# Patient Record
Sex: MALE | Race: WHITE | NOT HISPANIC OR LATINO | Employment: FULL TIME | URBAN - METROPOLITAN AREA
[De-identification: names, ages, dates, MRNs, and addresses within clinical notes are randomized per-mention and may not be internally consistent; named-entity substitution may affect disease eponyms.]

---

## 2017-05-17 ENCOUNTER — RESOLUTE PROFESSIONAL BILLING HOSPITAL PROF FEE (OUTPATIENT)
Dept: HOSPITALIST | Facility: MEDICAL CENTER | Age: 37
End: 2017-05-17
Payer: COMMERCIAL

## 2017-05-18 ENCOUNTER — APPOINTMENT (OUTPATIENT)
Dept: RADIOLOGY | Facility: MEDICAL CENTER | Age: 37
DRG: 872 | End: 2017-05-18
Attending: EMERGENCY MEDICINE
Payer: COMMERCIAL

## 2017-05-18 ENCOUNTER — HOSPITAL ENCOUNTER (INPATIENT)
Facility: MEDICAL CENTER | Age: 37
LOS: 3 days | DRG: 872 | End: 2017-05-21
Attending: EMERGENCY MEDICINE | Admitting: HOSPITALIST
Payer: COMMERCIAL

## 2017-05-18 DIAGNOSIS — L03.116 CELLULITIS OF LEFT LOWER EXTREMITY: ICD-10-CM

## 2017-05-18 LAB
ALBUMIN SERPL BCP-MCNC: 3.3 G/DL (ref 3.2–4.9)
ALBUMIN/GLOB SERPL: 0.8 G/DL
ALP SERPL-CCNC: 103 U/L (ref 30–99)
ALT SERPL-CCNC: 76 U/L (ref 2–50)
ANION GAP SERPL CALC-SCNC: 7 MMOL/L (ref 0–11.9)
APTT PPP: 35.6 SEC (ref 24.7–36)
AST SERPL-CCNC: 27 U/L (ref 12–45)
BASOPHILS # BLD AUTO: 0.5 % (ref 0–1.8)
BASOPHILS # BLD: 0.09 K/UL (ref 0–0.12)
BILIRUB SERPL-MCNC: 0.9 MG/DL (ref 0.1–1.5)
BUN SERPL-MCNC: 13 MG/DL (ref 8–22)
CALCIUM SERPL-MCNC: 9.2 MG/DL (ref 8.5–10.5)
CHLORIDE SERPL-SCNC: 100 MMOL/L (ref 96–112)
CO2 SERPL-SCNC: 25 MMOL/L (ref 20–33)
CREAT SERPL-MCNC: 0.69 MG/DL (ref 0.5–1.4)
CRP SERPL HS-MCNC: 15.68 MG/DL (ref 0–0.75)
EOSINOPHIL # BLD AUTO: 0.08 K/UL (ref 0–0.51)
EOSINOPHIL NFR BLD: 0.5 % (ref 0–6.9)
ERYTHROCYTE [DISTWIDTH] IN BLOOD BY AUTOMATED COUNT: 40.8 FL (ref 35.9–50)
ERYTHROCYTE [SEDIMENTATION RATE] IN BLOOD BY WESTERGREN METHOD: 92 MM/HOUR (ref 0–15)
GFR SERPL CREATININE-BSD FRML MDRD: >60 ML/MIN/1.73 M 2
GLOBULIN SER CALC-MCNC: 4.1 G/DL (ref 1.9–3.5)
GLUCOSE SERPL-MCNC: 104 MG/DL (ref 65–99)
HCT VFR BLD AUTO: 40.3 % (ref 42–52)
HGB BLD-MCNC: 13.5 G/DL (ref 14–18)
IMM GRANULOCYTES # BLD AUTO: 0.31 K/UL (ref 0–0.11)
IMM GRANULOCYTES NFR BLD AUTO: 1.8 % (ref 0–0.9)
INR PPP: 1.15 (ref 0.87–1.13)
LACTATE BLD-SCNC: 2.3 MMOL/L (ref 0.5–2)
LYMPHOCYTES # BLD AUTO: 1.75 K/UL (ref 1–4.8)
LYMPHOCYTES NFR BLD: 9.9 % (ref 22–41)
MCH RBC QN AUTO: 29.5 PG (ref 27–33)
MCHC RBC AUTO-ENTMCNC: 33.5 G/DL (ref 33.7–35.3)
MCV RBC AUTO: 88.2 FL (ref 81.4–97.8)
MONOCYTES # BLD AUTO: 1.66 K/UL (ref 0–0.85)
MONOCYTES NFR BLD AUTO: 9.4 % (ref 0–13.4)
NEUTROPHILS # BLD AUTO: 13.8 K/UL (ref 1.82–7.42)
NEUTROPHILS NFR BLD: 77.9 % (ref 44–72)
NRBC # BLD AUTO: 0 K/UL
NRBC BLD AUTO-RTO: 0 /100 WBC
PLATELET # BLD AUTO: 471 K/UL (ref 164–446)
PMV BLD AUTO: 9.3 FL (ref 9–12.9)
POTASSIUM SERPL-SCNC: 4.1 MMOL/L (ref 3.6–5.5)
PROT SERPL-MCNC: 7.4 G/DL (ref 6–8.2)
PROTHROMBIN TIME: 15.1 SEC (ref 12–14.6)
RBC # BLD AUTO: 4.57 M/UL (ref 4.7–6.1)
SODIUM SERPL-SCNC: 132 MMOL/L (ref 135–145)
URATE SERPL-MCNC: 3.8 MG/DL (ref 2.5–8.3)
WBC # BLD AUTO: 17.7 K/UL (ref 4.8–10.8)

## 2017-05-18 PROCEDURE — 700111 HCHG RX REV CODE 636 W/ 250 OVERRIDE (IP): Performed by: HOSPITALIST

## 2017-05-18 PROCEDURE — 99222 1ST HOSP IP/OBS MODERATE 55: CPT | Performed by: HOSPITALIST

## 2017-05-18 PROCEDURE — 85610 PROTHROMBIN TIME: CPT

## 2017-05-18 PROCEDURE — 700105 HCHG RX REV CODE 258: Performed by: EMERGENCY MEDICINE

## 2017-05-18 PROCEDURE — 83605 ASSAY OF LACTIC ACID: CPT

## 2017-05-18 PROCEDURE — 700105 HCHG RX REV CODE 258: Performed by: HOSPITALIST

## 2017-05-18 PROCEDURE — 84550 ASSAY OF BLOOD/URIC ACID: CPT

## 2017-05-18 PROCEDURE — 99285 EMERGENCY DEPT VISIT HI MDM: CPT

## 2017-05-18 PROCEDURE — A9270 NON-COVERED ITEM OR SERVICE: HCPCS | Performed by: HOSPITALIST

## 2017-05-18 PROCEDURE — 85025 COMPLETE CBC W/AUTO DIFF WBC: CPT

## 2017-05-18 PROCEDURE — 700111 HCHG RX REV CODE 636 W/ 250 OVERRIDE (IP): Performed by: EMERGENCY MEDICINE

## 2017-05-18 PROCEDURE — 73610 X-RAY EXAM OF ANKLE: CPT | Mod: LT

## 2017-05-18 PROCEDURE — 85730 THROMBOPLASTIN TIME PARTIAL: CPT

## 2017-05-18 PROCEDURE — 700105 HCHG RX REV CODE 258

## 2017-05-18 PROCEDURE — 87040 BLOOD CULTURE FOR BACTERIA: CPT

## 2017-05-18 PROCEDURE — 93971 EXTREMITY STUDY: CPT | Mod: 26 | Performed by: SURGERY

## 2017-05-18 PROCEDURE — 96375 TX/PRO/DX INJ NEW DRUG ADDON: CPT

## 2017-05-18 PROCEDURE — 86140 C-REACTIVE PROTEIN: CPT

## 2017-05-18 PROCEDURE — 700102 HCHG RX REV CODE 250 W/ 637 OVERRIDE(OP): Performed by: HOSPITALIST

## 2017-05-18 PROCEDURE — 96365 THER/PROPH/DIAG IV INF INIT: CPT

## 2017-05-18 PROCEDURE — 93971 EXTREMITY STUDY: CPT

## 2017-05-18 PROCEDURE — 770006 HCHG ROOM/CARE - MED/SURG/GYN SEMI*

## 2017-05-18 PROCEDURE — 700111 HCHG RX REV CODE 636 W/ 250 OVERRIDE (IP)

## 2017-05-18 PROCEDURE — 85652 RBC SED RATE AUTOMATED: CPT

## 2017-05-18 PROCEDURE — 80053 COMPREHEN METABOLIC PANEL: CPT

## 2017-05-18 RX ORDER — ONDANSETRON 2 MG/ML
4 INJECTION INTRAMUSCULAR; INTRAVENOUS ONCE
Status: COMPLETED | OUTPATIENT
Start: 2017-05-18 | End: 2017-05-18

## 2017-05-18 RX ORDER — ERGOCALCIFEROL 1.25 MG/1
50000 CAPSULE ORAL
COMMUNITY

## 2017-05-18 RX ORDER — OXYCODONE AND ACETAMINOPHEN 7.5; 325 MG/1; MG/1
1 TABLET ORAL EVERY 8 HOURS PRN
COMMUNITY
End: 2019-03-31

## 2017-05-18 RX ORDER — LOSARTAN POTASSIUM 25 MG/1
25 TABLET ORAL DAILY
COMMUNITY
End: 2017-05-18

## 2017-05-18 RX ORDER — MELOXICAM 15 MG/1
15 TABLET ORAL DAILY
COMMUNITY
End: 2017-06-15 | Stop reason: SDUPTHER

## 2017-05-18 RX ORDER — SODIUM CHLORIDE 9 MG/ML
INJECTION, SOLUTION INTRAVENOUS CONTINUOUS
Status: DISCONTINUED | OUTPATIENT
Start: 2017-05-18 | End: 2017-05-20

## 2017-05-18 RX ORDER — PROMETHAZINE HYDROCHLORIDE 25 MG/1
12.5-25 TABLET ORAL EVERY 4 HOURS PRN
Status: DISCONTINUED | OUTPATIENT
Start: 2017-05-18 | End: 2017-05-21 | Stop reason: HOSPADM

## 2017-05-18 RX ORDER — AMOXICILLIN 250 MG
2 CAPSULE ORAL 2 TIMES DAILY
Status: DISCONTINUED | OUTPATIENT
Start: 2017-05-18 | End: 2017-05-21 | Stop reason: HOSPADM

## 2017-05-18 RX ORDER — PREDNISONE 20 MG/1
10 TABLET ORAL DAILY
Status: DISCONTINUED | OUTPATIENT
Start: 2017-05-18 | End: 2017-05-20

## 2017-05-18 RX ORDER — TRAMADOL HYDROCHLORIDE 50 MG/1
50 TABLET ORAL EVERY 6 HOURS PRN
COMMUNITY
End: 2019-03-31

## 2017-05-18 RX ORDER — FLUTICASONE PROPIONATE 50 MCG
1 SPRAY, SUSPENSION (ML) NASAL DAILY
COMMUNITY

## 2017-05-18 RX ORDER — DEXTROAMPHETAMINE SACCHARATE, AMPHETAMINE ASPARTATE MONOHYDRATE, DEXTROAMPHETAMINE SULFATE AND AMPHETAMINE SULFATE 2.5; 2.5; 2.5; 2.5 MG/1; MG/1; MG/1; MG/1
10 CAPSULE, EXTENDED RELEASE ORAL EVERY MORNING
COMMUNITY
End: 2017-05-18

## 2017-05-18 RX ORDER — LOSARTAN POTASSIUM 50 MG/1
100 TABLET ORAL DAILY
Status: DISCONTINUED | OUTPATIENT
Start: 2017-05-18 | End: 2017-05-21 | Stop reason: HOSPADM

## 2017-05-18 RX ORDER — HYDROCODONE BITARTRATE AND ACETAMINOPHEN 10; 325 MG/1; MG/1
1-2 TABLET ORAL EVERY 6 HOURS PRN
COMMUNITY
End: 2017-05-18

## 2017-05-18 RX ORDER — PROMETHAZINE HYDROCHLORIDE 25 MG/1
12.5-25 SUPPOSITORY RECTAL EVERY 4 HOURS PRN
Status: DISCONTINUED | OUTPATIENT
Start: 2017-05-18 | End: 2017-05-21 | Stop reason: HOSPADM

## 2017-05-18 RX ORDER — IBUPROFEN 400 MG/1
600 TABLET ORAL EVERY 6 HOURS PRN
Status: DISCONTINUED | OUTPATIENT
Start: 2017-05-18 | End: 2017-05-21 | Stop reason: HOSPADM

## 2017-05-18 RX ORDER — POLYETHYLENE GLYCOL 3350 17 G/17G
1 POWDER, FOR SOLUTION ORAL
Status: DISCONTINUED | OUTPATIENT
Start: 2017-05-18 | End: 2017-05-21 | Stop reason: HOSPADM

## 2017-05-18 RX ORDER — LOSARTAN POTASSIUM 100 MG/1
100 TABLET ORAL DAILY
Status: ON HOLD | COMMUNITY
End: 2017-05-21

## 2017-05-18 RX ORDER — MORPHINE SULFATE 4 MG/ML
4 INJECTION, SOLUTION INTRAMUSCULAR; INTRAVENOUS ONCE
Status: COMPLETED | OUTPATIENT
Start: 2017-05-18 | End: 2017-05-18

## 2017-05-18 RX ORDER — ONDANSETRON 4 MG/1
4 TABLET, ORALLY DISINTEGRATING ORAL EVERY 4 HOURS PRN
Status: DISCONTINUED | OUTPATIENT
Start: 2017-05-18 | End: 2017-05-21 | Stop reason: HOSPADM

## 2017-05-18 RX ORDER — BISACODYL 10 MG
10 SUPPOSITORY, RECTAL RECTAL
Status: DISCONTINUED | OUTPATIENT
Start: 2017-05-18 | End: 2017-05-21 | Stop reason: HOSPADM

## 2017-05-18 RX ORDER — TRAMADOL HYDROCHLORIDE 50 MG/1
50 TABLET ORAL EVERY 6 HOURS PRN
Status: DISCONTINUED | OUTPATIENT
Start: 2017-05-18 | End: 2017-05-21 | Stop reason: HOSPADM

## 2017-05-18 RX ORDER — PREDNISONE 10 MG/1
10 TABLET ORAL DAILY
Status: ON HOLD | COMMUNITY
Start: 2017-05-10 | End: 2017-05-21

## 2017-05-18 RX ORDER — MELOXICAM 7.5 MG/1
15 TABLET ORAL
Status: DISCONTINUED | OUTPATIENT
Start: 2017-05-18 | End: 2017-05-21 | Stop reason: HOSPADM

## 2017-05-18 RX ORDER — DEXTROAMPHETAMINE SACCHARATE, AMPHETAMINE ASPARTATE, DEXTROAMPHETAMINE SULFATE AND AMPHETAMINE SULFATE 2.5; 2.5; 2.5; 2.5 MG/1; MG/1; MG/1; MG/1
5 TABLET ORAL 2 TIMES DAILY
Status: DISCONTINUED | OUTPATIENT
Start: 2017-05-18 | End: 2017-05-21 | Stop reason: HOSPADM

## 2017-05-18 RX ORDER — ONDANSETRON 2 MG/ML
4 INJECTION INTRAMUSCULAR; INTRAVENOUS EVERY 4 HOURS PRN
Status: DISCONTINUED | OUTPATIENT
Start: 2017-05-18 | End: 2017-05-21 | Stop reason: HOSPADM

## 2017-05-18 RX ORDER — OXYCODONE AND ACETAMINOPHEN 7.5; 325 MG/1; MG/1
1 TABLET ORAL EVERY 6 HOURS PRN
Status: DISCONTINUED | OUTPATIENT
Start: 2017-05-18 | End: 2017-05-21 | Stop reason: HOSPADM

## 2017-05-18 RX ORDER — AMPICILLIN AND SULBACTAM 2; 1 G/1; G/1
3 INJECTION, POWDER, FOR SOLUTION INTRAMUSCULAR; INTRAVENOUS ONCE
Status: COMPLETED | OUTPATIENT
Start: 2017-05-18 | End: 2017-05-18

## 2017-05-18 RX ORDER — CYCLOBENZAPRINE HCL 10 MG
10 TABLET ORAL 3 TIMES DAILY PRN
COMMUNITY
End: 2017-05-18

## 2017-05-18 RX ORDER — DEXTROAMPHETAMINE SACCHARATE, AMPHETAMINE ASPARTATE, DEXTROAMPHETAMINE SULFATE AND AMPHETAMINE SULFATE 1.25; 1.25; 1.25; 1.25 MG/1; MG/1; MG/1; MG/1
5 TABLET ORAL 2 TIMES DAILY
COMMUNITY
End: 2019-09-15

## 2017-05-18 RX ADMIN — MELOXICAM 15 MG: 7.5 TABLET ORAL at 16:55

## 2017-05-18 RX ADMIN — ENOXAPARIN SODIUM 40 MG: 100 INJECTION SUBCUTANEOUS at 12:15

## 2017-05-18 RX ADMIN — ONDANSETRON 4 MG: 2 INJECTION INTRAMUSCULAR; INTRAVENOUS at 08:24

## 2017-05-18 RX ADMIN — OXYCODONE AND ACETAMINOPHEN 1 TABLET: 7.5; 325 TABLET ORAL at 19:15

## 2017-05-18 RX ADMIN — AMPICILLIN SODIUM AND SULBACTAM SODIUM 3 G: 2; 1 INJECTION, POWDER, FOR SOLUTION INTRAMUSCULAR; INTRAVENOUS at 23:48

## 2017-05-18 RX ADMIN — AMPICILLIN SODIUM AND SULBACTAM SODIUM 3 G: 2; 1 INJECTION, POWDER, FOR SOLUTION INTRAMUSCULAR; INTRAVENOUS at 15:56

## 2017-05-18 RX ADMIN — IBUPROFEN 600 MG: 400 TABLET, FILM COATED ORAL at 20:31

## 2017-05-18 RX ADMIN — DEXTROAMPHETAMINE SACCHARATE, AMPHETAMINE ASPARTATE, DEXTROAMPHETAMINE SULFATE AND AMPHETAMINE SULFATE 5 MG: 2.5; 2.5; 2.5; 2.5 TABLET ORAL at 15:55

## 2017-05-18 RX ADMIN — PREDNISONE 10 MG: 20 TABLET ORAL at 12:46

## 2017-05-18 RX ADMIN — SODIUM CHLORIDE: 9 INJECTION, SOLUTION INTRAVENOUS at 12:12

## 2017-05-18 RX ADMIN — SODIUM CHLORIDE: 9 INJECTION, SOLUTION INTRAVENOUS at 08:23

## 2017-05-18 RX ADMIN — VANCOMYCIN HYDROCHLORIDE 3000 MG: 100 INJECTION, POWDER, LYOPHILIZED, FOR SOLUTION INTRAVENOUS at 10:15

## 2017-05-18 RX ADMIN — SODIUM CHLORIDE: 9 INJECTION, SOLUTION INTRAVENOUS at 12:47

## 2017-05-18 RX ADMIN — MORPHINE SULFATE 4 MG: 4 INJECTION INTRAVENOUS at 08:24

## 2017-05-18 RX ADMIN — VANCOMYCIN HYDROCHLORIDE 1800 MG: 100 INJECTION, POWDER, LYOPHILIZED, FOR SOLUTION INTRAVENOUS at 20:31

## 2017-05-18 RX ADMIN — OXYCODONE AND ACETAMINOPHEN 1 TABLET: 7.5; 325 TABLET ORAL at 12:15

## 2017-05-18 RX ADMIN — LOSARTAN POTASSIUM 100 MG: 50 TABLET, FILM COATED ORAL at 12:15

## 2017-05-18 RX ADMIN — AMPICILLIN SODIUM AND SULBACTAM SODIUM 3 G: 2; 1 INJECTION, POWDER, FOR SOLUTION INTRAMUSCULAR; INTRAVENOUS at 11:01

## 2017-05-18 RX ADMIN — TRAMADOL HYDROCHLORIDE 50 MG: 50 TABLET, COATED ORAL at 15:55

## 2017-05-18 ASSESSMENT — PATIENT HEALTH QUESTIONNAIRE - PHQ9
SUM OF ALL RESPONSES TO PHQ QUESTIONS 1-9: 0
1. LITTLE INTEREST OR PLEASURE IN DOING THINGS: NOT AT ALL
2. FEELING DOWN, DEPRESSED, IRRITABLE, OR HOPELESS: NOT AT ALL
SUM OF ALL RESPONSES TO PHQ9 QUESTIONS 1 AND 2: 0

## 2017-05-18 ASSESSMENT — LIFESTYLE VARIABLES
HAVE YOU EVER FELT YOU SHOULD CUT DOWN ON YOUR DRINKING: NO
HAVE PEOPLE ANNOYED YOU BY CRITICIZING YOUR DRINKING: NO
ALCOHOL_USE: YES
TOTAL SCORE: 0
EVER HAD A DRINK FIRST THING IN THE MORNING TO STEADY YOUR NERVES TO GET RID OF A HANGOVER: NO
CONSUMPTION TOTAL: NEGATIVE
ON A TYPICAL DAY WHEN YOU DRINK ALCOHOL HOW MANY DRINKS DO YOU HAVE: 0
TOTAL SCORE: 0
TOTAL SCORE: 0
HOW MANY TIMES IN THE PAST YEAR HAVE YOU HAD 5 OR MORE DRINKS IN A DAY: 0
EVER FELT BAD OR GUILTY ABOUT YOUR DRINKING: NO
EVER_SMOKED: NEVER
AVERAGE NUMBER OF DAYS PER WEEK YOU HAVE A DRINK CONTAINING ALCOHOL: 0
DO YOU DRINK ALCOHOL: NO

## 2017-05-18 ASSESSMENT — COGNITIVE AND FUNCTIONAL STATUS - GENERAL
MOVING FROM LYING ON BACK TO SITTING ON SIDE OF FLAT BED: A LITTLE
SUGGESTED CMS G CODE MODIFIER MOBILITY: CK
MOVING TO AND FROM BED TO CHAIR: A LITTLE
STANDING UP FROM CHAIR USING ARMS: A LITTLE
DAILY ACTIVITIY SCORE: 22
HELP NEEDED FOR BATHING: A LITTLE
DRESSING REGULAR LOWER BODY CLOTHING: A LITTLE
WALKING IN HOSPITAL ROOM: A LOT
CLIMB 3 TO 5 STEPS WITH RAILING: A LOT
MOBILITY SCORE: 17
SUGGESTED CMS G CODE MODIFIER DAILY ACTIVITY: CJ

## 2017-05-18 ASSESSMENT — PAIN SCALES - GENERAL
PAINLEVEL_OUTOF10: 5
PAINLEVEL_OUTOF10: 5
PAINLEVEL_OUTOF10: 3
PAINLEVEL_OUTOF10: 6
PAINLEVEL_OUTOF10: 7
PAINLEVEL_OUTOF10: 4

## 2017-05-18 NOTE — IP AVS SNAPSHOT
Sliced Apples Access Code: [email or web address]  Expires: 6/20/2017 12:37 PM    Sliced Apples  A secure, online tool to manage your health information     [email or web address]’s Sliced Apples® is a secure, online tool that connects you to your personalized health information from the privacy of your home -- day or night - making it very easy for you to manage your healthcare. Once the activation process is completed, you can even access your medical information using the Sliced Apples ijeoma, which is available for free in the Apple Ijeoma store or Google Play store.     Sliced Apples provides the following levels of access (as shown below):   My Chart Features   [facility] Primary Care Doctor [facility]  Specialists [facility]  Urgent  Care Non-[facility]  Primary Care  Doctor   Email your healthcare team securely and privately 24/7 X X X X   Manage appointments: schedule your next appointment; view details of past/upcoming appointments X      Request prescription refills. X      View recent personal medical records, including lab and immunizations X X X X   View health record, including health history, allergies, medications X X X X   Read reports about your outpatient visits, procedures, consult and ER notes X X X X   See your discharge summary, which is a recap of your hospital and/or ER visit that includes your diagnosis, lab results, and care plan. X X       How to register for Sliced Apples:  1. Go to  [email or web address].  2. Click on the Sign Up Now box, which takes you to the New Member Sign Up page. You will need to provide the following information:  a. Enter your Sliced Apples Access Code exactly as it appears at the top of this page. (You will not need to use this code after you’ve completed the sign-up process. If you do not sign up before the expiration date, you must request a new code.)   b. Enter your date of birth.   c. Enter your home email address.   d. Click Submit, and follow the next screen’s instructions.  3. Create a Sliced Apples ID. This will be your Sliced Apples  login ID and cannot be changed, so think of one that is secure and easy to remember.  4. Create a [facility] password. You can change your password at any time.  5. Enter your Password Reset Question and Answer. This can be used at a later time if you forget your password.   6. Enter your e-mail address. This allows you to receive e-mail notifications when new information is available in [employer].  7. Click Sign Up. You can now view your health information.    For assistance activating your [employer] account, call [phone]

## 2017-05-18 NOTE — PROGRESS NOTES
Pt a+ox4, oriented to new floor. Pt admitted with LLE cellulitis, iv abx infusing. Pt medicated for pain in that leg per MAR. Call bell within reach, pt calls appropriately for help. Pt refusing to be mobilized at this point due to pain in ankle. Pt with no open wounds, skin intact.

## 2017-05-18 NOTE — ED PROVIDER NOTES
ED Provider Note    CHIEF COMPLAINT   Chief Complaint   Patient presents with   • Ankle Swelling     left       HPI   Anmol Herring is a 36 y.o. male with history of hypertension, L4-L5 ruptured disc who presents with complaints of increased pain, redness, swelling to his ankle for the past day. The patient 1st noted some pain developing about 3 weeks ago and thought it was due to his back. The patient developed some pain and swelling to his right knee about a month ago, and had a MRI of the knee which was negative, and then had a MRI of the lumbar spine which showed a ruptured disc at L4-L5. The patient notes there is been having worsening pain over the past several weeks, which became much worse last night. He has not been able to bear weight. He has had increase in redness and swelling to the left ankle since last night. He denies fever, chills, sore throat, cough, vomiting, or diarrhea. He denies any history of arthritis or gout.    REVIEW OF SYSTEMS   See HPI for further details. All other systems are negative.     PAST MEDICAL HISTORY   Past Medical History   Diagnosis Date   • ADD (attention deficit disorder)    • Hypertension        FAMILY HISTORY  History reviewed. No pertinent family history.    SOCIAL HISTORY  Social History     Social History   • Marital Status: N/A     Spouse Name: N/A   • Number of Children: N/A   • Years of Education: N/A     Social History Main Topics   • Smoking status: Never Smoker    • Smokeless tobacco: None   • Alcohol Use: No   • Drug Use: No   • Sexual Activity: Not Asked     Other Topics Concern   • None     Social History Narrative   • None       SURGICAL HISTORY  History reviewed. No pertinent past surgical history.    CURRENT MEDICATIONS   Home Medications     **Home medications have not yet been reviewed for this encounter**          ALLERGIES   No Known Allergies    PHYSICAL EXAM  VITAL SIGNS: /79 mmHg  Pulse 110  Temp(Src) 37 °C (98.6 °F)  Resp 18  Ht 2.007 m (6'  "7\")  Wt 127.007 kg (280 lb)  BMI 31.53 kg/m2  SpO2 98%  Constitutional: Well developed, Well nourished, No acute distress, Non-toxic appearance.   Extremities: Intact peripheral pulses, there is moderate swelling, erythema, increased warmth to the medial portion of the left ankle extending over the dorsum of the ankle. There is no redness or tenderness noted to the lateral foot or ankle. There is a good dorsalis pedis pulse, good sensation to the toes, good capillary refill. No pain or tenderness to the left knee or hip.         RADIOLOGY/PROCEDURES  DX-ANKLE 3+ VIEWS LEFT   Final Result      Soft tissue swelling. No acute fracture.      LE VENOUS DUPLEX (Specify in Comments Left, Right Or Bilateral)   Preliminary Result       negative for DVT on preliminary reading.      COURSE & MEDICAL DECISION MAKING  Pertinent Labs & Imaging studies reviewed. (See chart for details)  The patient presents with above complaints. IV is placed, he is given normal saline, morphine, and Zofran.  Ultrasound of the left lower extremity was negative for DVT. X-rays of the left ankle shows no obvious fracture or bony erosion. CBC shows a white count of 17,700, hemoglobin 13.5, 70% polys, chemistries sodium 132, glucose 104, SGPT 76, otherwise normal, C-reactive protein elevated 15.68, uric acid normal 3.8.  Patient likely does not have gout given the low uric acid. There is no evidence of a DVT. The patient will be treated for cellulitis and was given a dose of Unasyn and vancomycin. At this time there is no evidence of a septic joint.  Findings were discussed the patient and his significant other. Case was discussed with Dr. Alicia for admission.    FINAL IMPRESSION  1. Cellulitis to the left foot and ankle  2.   3.      Electronically signed by: Adalberto Collazo, 5/18/2017 8:15 AM    "

## 2017-05-18 NOTE — IP AVS SNAPSHOT
" <p align=\"LEFT\"><IMG SRC=\"//[email or web address]\" alt=\"Image\" WIDTH=\"50%\" HEIGHT=\"200\" BORDER=\"\"></p>                   Name:Anmol Herring  Medical Record Number:[id number]  CSN: [id number]    YOB: 1980   Age: 36 y.o.  Sex: male  HT:2.007 m (6' 7\") WT: 127.007 kg (280 lb)          Admit Date: 5/18/2017     Discharge Date:   Today's Date: 5/21/2017  Attending Doctor:  Lacy Guzman M.D.                  Allergies:  Review of patient's allergies indicates no known allergies.          Follow-up Information     1. Follow up with Anup Saha. Schedule an appointment as soon as possible for a visit in 2 weeks.    Why:  Follow up on cellulitis        2. Follow up In 10 days.         Medication List      Take these Medications        Instructions    ADDERALL (5MG) 5 MG Tabs   Generic drug:  amphetamine-dextroamphetamine    Take 5 mg by mouth 2 times a day.   Dose:  5 mg       amoxicillin-clavulanate 875-125 MG Tabs   Commonly known as:  AUGMENTIN    Take 1 Tab by mouth 2 times a day for 8 days.   Dose:  1 Tab       cyclobenzaprine 10 MG Tabs   Commonly known as:  FLEXERIL    Take 10 mg by mouth 3 times a day as needed for Moderate Pain or Muscle Spasms.   Dose:  10 mg       doxycycline 100 MG Tabs   Commonly known as:  VIBRAMYCIN    Take 1 Tab by mouth 2 times a day for 8 days.   Dose:  100 mg       fluticasone 50 MCG/ACT nasal spray   Commonly known as:  FLONASE    Spray 1 Spray in nose every day.   Dose:  1 Spray       losartan 100 MG Tabs   Commonly known as:  COZAAR    Take 1 Tab by mouth every day.   Dose:  100 mg       meloxicam 15 MG tablet   Commonly known as:  MOBIC    Take 15 mg by mouth every day.   Dose:  15 mg       metoprolol 25 MG Tabs   Commonly known as:  LOPRESSOR    Take 1 Tab by mouth 2 Times a Day.   Dose:  25 mg       oxycodone-acetaminophen 7.5-325 MG per tablet   Commonly known as:  PERCOCET    Take 1 Tab by mouth every 8 hours as needed.   Dose:  1 Tab       tramadol 50 MG Tabs  "   Commonly known as:  ULTRAM    Take 50 mg by mouth every 6 hours as needed.   Dose:  50 mg       vitamin D (Ergocalciferol) 59257 UNITS Caps capsule   Commonly known as:  DRISDOL    Take 50,000 Units by mouth every 7 days.   Dose:  41482 Units

## 2017-05-18 NOTE — H&P
DATE OF ADMISSION:  05/18/2017    PRIMARY CARE PHYSICIAN:  Currently, none.    CHIEF COMPLAINT:  Left ankle region swelling, pain and redness.    HISTORY OF PRESENT ILLNESS:  Patient is a 36-year-old male with history of   hypertension, recent diagnosis of L4-L5 ruptured disk, 05/11/2017, had right   knee pain and swelling attributed to bad back compensating gait, attention   deficit hyperactive disorder, evaluated here at [facility] with complaints of left   medial ankle region pain, swelling, and redness.  Patient reports he has had   some joint swelling of lower extremities recently with increased redness,   pain, swelling, warmth to the medial ankle area.  Patient reports no fevers,   chills.  No nausea, vomiting, no abdominal pain.  Does have lower back pain,   which initially began as hip pain after an accident about a year ago, had a   followup with the chiropractor at [place] for manipulation of hip pain and   noted that he could not move as well.  Patient reports recent diagnosis of   L4-L5 disk rupture with his back pain symptoms.  Reports no chest pain or   shortness of breath.  No nausea, vomiting.  Has had difficulty ambulating with   his left ankle pain and swelling.    REVIEW OF SYSTEMS:  As above, otherwise negative according to AMA and CMS   criteria.    PAST MEDICAL HISTORY:  Includes hypertension, reported lumbar disk rupture at   L4-L5, right knee swelling attributed to his back pain causing changes to his   gait, attention deficit hyperactive disorder.    Testosterone and vitamin D deficiency.    PAST SURGICAL HISTORY:  None reported.    HOME MEDICATIONS:  Include Adderall 5 mg b.i.d., Flonase 50 mcg daily, Cozaar   100 mg daily, Mobic 15 mg daily, Percocet 7.5/325 every 8 hours as needed,   prednisone taper, tramadol 50 mg q. 6 p.r.n. and vitamin D 50,000 units every   7 days.    ALLERGIES:  No known drug allergies.    SOCIAL HISTORY:  No tobacco or alcohol, currently relocating from [place]  for work at  [employer].    FAMILY HISTORY:  Hypertension present.    PHYSICAL EXAMINATION:  VITAL SIGNS:  Temperature of 36.4, pulse in the low 100s, blood pressure of   126/76, respirations 18, 96% on room air, 127 kilograms.  GENERAL:  Patient was alert, appropriate, oriented.  No apparent distress,   severely obese.  HEENT:  Anicteric.  Extraocular movements are intact.  Mucous membranes were   moist.  NECK:  No cervical or supraclavicular adenopathy.  Trachea midline.  CARDIOVASCULAR:  Tachycardic, regular, without murmurs.  LUNGS:  Clear to auscultation bilaterally.  CHEST:  Normal chest wall excursion effort without chest wall tenderness.  ABDOMEN:  Obese, soft, nontender, nondistended.  No hepatosplenomegaly.  BACK:  No CVA or paraspinal tenderness.  EXTREMITIES:  His left medial ankle region with mild swelling.  There is   moderate erythema over approximately a 6x3 cm area with increased warmth and   tenderness to touch.  He had good pedal pulses.  Good range of motion with   plantar and dorsiflexion.  Capillary refill is normal.  NEUROLOGIC:  Cranial nerves grossly intact.  No focal weakness.  SKIN:  Warm, dry without pallor.  PSYCHIATRIC:  Calm, cooperative without depressed affect.    LABORATORY DATA:  Patient's labs reveal white count of 17,000, hemoglobin   13.5, platelet count of 471,000.  ESR 92.  Immature granulocytes 1.8, BUN and   creatinine of 13 and 0.6, blood sugar 104.  Sodium 132, ALT of 76, alkaline   phosphatase 103, lactic acid 2.3.    IMAGING:  A 3-view of the ankle revealed soft tissue swelling.  No acute   fracture.    Lower extremity duplex revealed no DVT.  Incidental findings of avascular   structure at the left popliteal fossa extending to the calf, approximately   1x2x6 cm.    IMPRESSION AND PLAN:  1.  Left medial foot ankle region cellulitis.  The patient will be admitted   acute to medical floor.  We will start IV vancomycin, Unasyn.  Differential   includes underlying autoimmune  process as he has had right knee swelling,   which has improved.  Consideration also for gout.  We will continue with   prednisone, provide analgesics, pain control with p.r.n. ibuprofen, Percocet.    Does not appear to be intraarticular on exam.  2.  Sepsis, attributed to left leg cellulitis, simple.  We will provide IV   fluids.  Institution of antibiotics as above.  Follow serial lactic acid   levels.  3.  Leukocytosis, attributed to steroids versus infection.  4.  History of hypertension, currently normotensive.  Resume outpatient   medications including Cozaar.  5.  History of attention deficit hyperactive disorder.  Continue with home   Adderall.  6.  Recent ruptured L4-L5 disk, neurologically stable.  Continue with   analgesics.    Anticipate greater than 2 midnights stay.     ____________________________________     ZENA PARKER MD    [facility] / NTS    DD:  05/18/2017 13:15:20  DT:  05/18/2017 16:01:01    D#:  [id number]  Job#:  [id number]

## 2017-05-18 NOTE — IP AVS SNAPSHOT
5/21/2017    Anmol Herring  [address] [place] [address]    Dear Anmol:    [facility] wants to ensure your discharge home is safe and you or your loved ones have had all of your questions answered regarding your care after you leave the hospital.    Below is a list of resources and contact information should you have any questions regarding your hospital stay, follow-up instructions, or active medical symptoms.    Questions or Concerns Regarding… Contact   Medical Questions Related to Your Discharge  (7 days a week, 8am-5pm) Contact a Nurse Care Coordinator   [phone]   Medical Questions Not Related to Your Discharge  (24 hours a day / 7 days a week)  Contact the Nurse Health Line   [phone]    Medications or Discharge Instructions Refer to your discharge packet   or contact your [facility] Primary Care Provider   [phone]   Follow-up Appointment(s) Schedule your appointment via [facility]   or contact Scheduling [phone]   Billing Review your statement via [facility]  or contact Billing [phone]   Medical Records Review your records via [facility]TS[facility]   or contact Medical Records [phone]     You may receive a telephone call within two days of discharge. This call is to make certain you understand your discharge instructions and have the opportunity to have any questions answered. You can also easily access your medical information, test results and upcoming appointments via the [facility] free online health management tool. You can learn more and sign up at [email or web address]. For assistance setting up your CTSpace account, please call [phone].    Once again, we want to ensure your discharge home is safe and that you have a clear understanding of any next steps in your care. If you have any questions or concerns, please do not hesitate to contact us, we are here for you. Thank you for choosing [facility] for your healthcare needs.    Sincerely,    Your [facility] Healthcare Team

## 2017-05-18 NOTE — IP AVS SNAPSHOT
" Home Care Instructions                                                                                                                  Name:Anmol Herring  Medical Record Number:[id number]  CSN: [id number]    YOB: 1980   Age: 36 y.o.  Sex: male  HT:2.007 m (6' 7\") WT: 127.007 kg (280 lb)          Admit Date: 5/18/2017     Discharge Date:   Today's Date: 5/21/2017  Attending Doctor:  Lacy Guzman M.D.                  Allergies:  Review of patient's allergies indicates no known allergies.            Discharge Instructions         Discharge Instructions    Discharged to home by car with relative. Discharged via wheelchair, hospital escort: Yes.  Special equipment needed: Crutches    Be sure to schedule a follow-up appointment with your primary care doctor or any specialists as instructed.     Discharge Plan:   Diet Plan: Discussed  Activity Level: Discussed  Confirmed Follow up Appointment: Patient to Call and Schedule Appointment  Confirmed Symptoms Management: Discussed  Medication Reconciliation Updated: Yes  Influenza Vaccine Indication: Patient Refuses    I understand that a diet low in cholesterol, fat, and sodium is recommended for good health. Unless I have been given specific instructions below for another diet, I accept this instruction as my diet prescription.   Other diet: Regular      · Is patient discharged on Warfarin / Coumadin?   No     · Is patient Post Blood Transfusion?  No    Depression / Suicide Risk    As you are discharged from this [facility] facility, it is important to learn how to keep safe from harming yourself.    Recognize the warning signs:  · Abrupt changes in personality, positive or negative- including increase in energy   · Giving away possessions  · Change in eating patterns- significant weight changes-  positive or negative  · Change in sleeping patterns- unable to sleep or sleeping all the time   · Unwillingness or inability to communicate  · Depression  · Unusual " sadness, discouragement and loneliness  · Talk of wanting to die  · Neglect of personal appearance   · Rebelliousness- reckless behavior  · Withdrawal from people/activities they love  · Confusion- inability to concentrate     If you or a loved one observes any of these behaviors or has concerns about self-harm, here's what you can do:  · Talk about it- your feelings and reasons for harming yourself  · Remove any means that you might use to hurt yourself (examples: pills, rope, extension cords, firearm)  · Get professional help from the community (Mental Health, Substance Abuse, psychological counseling)  · Do not be alone:Call your Safe Contact- someone whom you trust who will be there for you.  · Call your local CRISIS HOTLINE 882-8840 or 406-799-1023  · Call your local Children's Mobile Crisis Response Team [place] (419) 660-8248 or www.GCW  · Call the toll free National Suicide Prevention Hotlines   · National Suicide Prevention Lifeline 601-575-ORGY (2288)  · [employer] [facility] Network 800-SUICIDE (504-3110)            Cellulitis  Cellulitis is an infection of the skin and the tissue beneath it. The infected area is usually red and tender. Cellulitis occurs most often in the arms and lower legs.   CAUSES   Cellulitis is caused by bacteria that enter the skin through cracks or cuts in the skin. The most common types of bacteria that cause cellulitis are staphylococci and streptococci.  SIGNS AND SYMPTOMS   · Redness and warmth.  · Swelling.  · Tenderness or pain.  · Fever.  DIAGNOSIS   Your health care provider can usually determine what is wrong based on a physical exam. Blood tests may also be done.  TREATMENT   Treatment usually involves taking an antibiotic medicine.  HOME CARE INSTRUCTIONS   2. Take your antibiotic medicine as directed by your health care provider. Finish the antibiotic even if you start to feel better.  3. Keep the infected arm or leg elevated to reduce  swelling.  4. Apply a warm cloth to the affected area up to 4 times per day to relieve pain.  5. Take medicines only as directed by your health care provider.  6. Keep all follow-up visits as directed by your health care provider.  SEEK MEDICAL CARE IF:   2. You notice red streaks coming from the infected area.  3. Your red area gets larger or turns dark in color.  4. Your bone or joint underneath the infected area becomes painful after the skin has healed.  5. Your infection returns in the same area or another area.  6. You notice a swollen bump in the infected area.  7. You develop new symptoms.  8. You have a fever.  SEEK IMMEDIATE MEDICAL CARE IF:   2. You feel very sleepy.  3. You develop vomiting or diarrhea.  4. You have a general ill feeling (malaise) with muscle aches and pains.  MAKE SURE YOU:   2. Understand these instructions.  3. Will watch your condition.  4. Will get help right away if you are not doing well or get worse.     This information is not intended to replace advice given to you by your health care provider. Make sure you discuss any questions you have with your health care provider.     Document Released: 09/27/2006 Document Revised: 01/08/2016 Document Reviewed: 03/04/2013  [employer] Interactive Patient Education ©2016 [employer]      Follow-up Information     1. Follow up with Anup Saha. Schedule an appointment as soon as possible for a visit in 2 weeks.    Why:  Follow up on cellulitis        2. Follow up In 10 days.         Discharge Medication Instructions:    Below are the medications your physician expects you to take upon discharge:    Review all your home medications and newly ordered medications with your doctor and/or pharmacist. Follow medication instructions as directed by your doctor and/or pharmacist.    Please keep your medication list with you and share with your physician.               Medication List      START taking these medications        Instructions     Morning Afternoon Evening Bedtime    amoxicillin-clavulanate 875-125 MG Tabs   Commonly known as:  AUGMENTIN        Take 1 Tab by mouth 2 times a day for 8 days.   Dose:  1 Tab                        doxycycline 100 MG Tabs   Commonly known as:  VIBRAMYCIN        Take 1 Tab by mouth 2 times a day for 8 days.   Dose:  100 mg                        metoprolol 25 MG Tabs   Last time this was given:  25 mg on 5/21/2017  8:21 AM   Commonly known as:  LOPRESSOR        Take 1 Tab by mouth 2 Times a Day.   Dose:  25 mg                          CONTINUE taking these medications        Instructions    Morning Afternoon Evening Bedtime    ADDERALL (5MG) 5 MG Tabs   Last time this was given:  5 mg on 5/21/2017  4:50 AM   Generic drug:  amphetamine-dextroamphetamine        Take 5 mg by mouth 2 times a day.   Dose:  5 mg                        cyclobenzaprine 10 MG Tabs   Last time this was given:  10 mg on 5/21/2017  4:50 AM   Commonly known as:  FLEXERIL        Take 10 mg by mouth 3 times a day as needed for Moderate Pain or Muscle Spasms.   Dose:  10 mg                        fluticasone 50 MCG/ACT nasal spray   Commonly known as:  FLONASE        Spray 1 Spray in nose every day.   Dose:  1 Spray                        losartan 100 MG Tabs   Last time this was given:  100 mg on 5/21/2017  8:20 AM   Commonly known as:  COZAAR        Take 1 Tab by mouth every day.   Dose:  100 mg                        meloxicam 15 MG tablet   Last time this was given:  15 mg on 5/18/2017  4:55 PM   Commonly known as:  MOBIC        Take 15 mg by mouth every day.   Dose:  15 mg                        oxycodone-acetaminophen 7.5-325 MG per tablet   Last time this was given:  1 Tab on 5/21/2017  1:56 AM   Commonly known as:  PERCOCET        Take 1 Tab by mouth every 8 hours as needed.   Dose:  1 Tab                        tramadol 50 MG Tabs   Last time this was given:  50 mg on 5/21/2017 12:33 PM   Commonly known as:  ULTRAM        Take 50 mg by  mouth every 6 hours as needed.   Dose:  50 mg                        vitamin D (Ergocalciferol) 44550 UNITS Caps capsule   Commonly known as:  DRISDOL        Take 50,000 Units by mouth every 7 days.   Dose:  22996 Units                          STOP taking these medications     predniSONE 10 MG Tabs   Commonly known as:  DELTASONE                    Where to Get Your Medications      You can get these medications from any pharmacy     Bring a paper prescription for each of these medications    - amoxicillin-clavulanate 875-125 MG Tabs  - doxycycline 100 MG Tabs  - losartan 100 MG Tabs  - metoprolol 25 MG Tabs            Instructions           Diet / Nutrition:    Follow any diet instructions given to you by your doctor or the dietician, including how much salt (sodium) you are allowed each day.    If you are overweight, talk to your doctor about a weight reduction plan.    Activity:    Remain physically active following your doctor's instructions about exercise and activity.    Rest often.     Any time you become even a little tired or short of breath, SIT DOWN and rest.    Worsening Symptoms:    Report any of the following signs and symptoms to the doctor's office immediately:    *Pain of jaw, arm, or neck  *Chest pain not relieved by medication                               *Dizziness or loss of consciousness  *Difficulty breathing even when at rest   *More tired than usual                                       *Bleeding drainage or swelling of surgical site  *Swelling of feet, ankles, legs or stomach                 *Fever (>100ºF)  *Pink or blood tinged sputum  *Weight gain (3lbs/day or 5lbs /week)           *Shock from internal defibrillator (if applicable)  *Palpitations or irregular heartbeats                *Cool and/or numb extremities    Stroke Awareness    Common Risk Factors for Stroke include:    Age  Atrial Fibrillation  Carotid Artery Stenosis  Diabetes Mellitus  Excessive alcohol consumption  High  blood pressure  Overweight   Physical inactivity  Smoking    Warning signs and symptoms of a stroke include:    *Sudden numbness or weakness of the face, arm or leg (especially on one side of the body).  *Sudden confusion, trouble speaking or understanding.  *Sudden trouble seeing in one or both eyes.  *Sudden trouble walking, dizziness, loss of balance or coordination.Sudden severe headache with no known cause.    It is very important to get treatment quickly when a stroke occurs. If you experience any of the above warning signs, call 911 immediately.                   Disclaimer         Quit Smoking / Tobacco Use:    I understand the use of any tobacco products increases my chance of suffering from future heart disease or stroke and could cause other illnesses which may shorten my life. Quitting the use of tobacco products is the single most important thing I can do to improve my health. For further information on smoking / tobacco cessation call a Toll Free Quit Line at 1-409.253.7397 (*National [employer]) or 1-626.950.3299 (American Lung Association) or you can access the web based program at www.lungSMS Assist.org.    [place] Tobacco Users Help Line:  (611) 620-3485       Toll Free: 1-373.879.9526  Quit Tobacco Program [facility] ([phone]    Crisis Hotline:    [place] Crisis Hotline:  3-690-GSGWKFV or 1-928.473.8624    [place] Crisis Hotline:    1-628.546.5022 or 414-717-3075    Discharge Survey:   Thank you for choosing [facility]. We hope we did everything we could to make your hospital stay a pleasant one. You may be receiving a phone survey and we would appreciate your time and participation in answering the questions. Your input is very valuable to us in our efforts to improve our service to our patients and their families.        My signature on this form indicates that:    1. I have reviewed and understand the above information.  2. My questions regarding this information  have been answered to my satisfaction.  3. I have formulated a plan with my discharge nurse to obtain my prescribed medications for home.                  Disclaimer         __________________________________                     __________       ________                       Patient Signature                                                 Date                    Time

## 2017-05-18 NOTE — CARE PLAN
Problem: Safety  Goal: Will remain free from falls  Intervention: Assess risk factors for falls  Pt refusing to mobilize due to pain . Pt refuses bed alarm, calls apporpriately for assistance and is a +ox4.

## 2017-05-19 LAB
ANION GAP SERPL CALC-SCNC: 9 MMOL/L (ref 0–11.9)
BUN SERPL-MCNC: 11 MG/DL (ref 8–22)
CALCIUM SERPL-MCNC: 8.9 MG/DL (ref 8.5–10.5)
CHLORIDE SERPL-SCNC: 103 MMOL/L (ref 96–112)
CO2 SERPL-SCNC: 26 MMOL/L (ref 20–33)
CREAT SERPL-MCNC: 0.67 MG/DL (ref 0.5–1.4)
GFR SERPL CREATININE-BSD FRML MDRD: >60 ML/MIN/1.73 M 2
GLUCOSE SERPL-MCNC: 94 MG/DL (ref 65–99)
POTASSIUM SERPL-SCNC: 4 MMOL/L (ref 3.6–5.5)
SODIUM SERPL-SCNC: 138 MMOL/L (ref 135–145)
VANCOMYCIN TROUGH SERPL-MCNC: 12 UG/ML (ref 10–20)

## 2017-05-19 PROCEDURE — 700111 HCHG RX REV CODE 636 W/ 250 OVERRIDE (IP)

## 2017-05-19 PROCEDURE — 700105 HCHG RX REV CODE 258: Performed by: HOSPITALIST

## 2017-05-19 PROCEDURE — A9270 NON-COVERED ITEM OR SERVICE: HCPCS | Performed by: FAMILY MEDICINE

## 2017-05-19 PROCEDURE — 700102 HCHG RX REV CODE 250 W/ 637 OVERRIDE(OP): Performed by: FAMILY MEDICINE

## 2017-05-19 PROCEDURE — 80048 BASIC METABOLIC PNL TOTAL CA: CPT

## 2017-05-19 PROCEDURE — 770006 HCHG ROOM/CARE - MED/SURG/GYN SEMI*

## 2017-05-19 PROCEDURE — 700111 HCHG RX REV CODE 636 W/ 250 OVERRIDE (IP): Performed by: HOSPITALIST

## 2017-05-19 PROCEDURE — 80202 ASSAY OF VANCOMYCIN: CPT

## 2017-05-19 PROCEDURE — 700102 HCHG RX REV CODE 250 W/ 637 OVERRIDE(OP): Performed by: HOSPITALIST

## 2017-05-19 PROCEDURE — 99232 SBSQ HOSP IP/OBS MODERATE 35: CPT | Performed by: FAMILY MEDICINE

## 2017-05-19 PROCEDURE — 700105 HCHG RX REV CODE 258: Performed by: EMERGENCY MEDICINE

## 2017-05-19 PROCEDURE — A9270 NON-COVERED ITEM OR SERVICE: HCPCS | Performed by: HOSPITALIST

## 2017-05-19 PROCEDURE — 700105 HCHG RX REV CODE 258

## 2017-05-19 PROCEDURE — 36415 COLL VENOUS BLD VENIPUNCTURE: CPT

## 2017-05-19 RX ORDER — CYCLOBENZAPRINE HCL 10 MG
10 TABLET ORAL 3 TIMES DAILY PRN
COMMUNITY
End: 2017-06-12 | Stop reason: SDUPTHER

## 2017-05-19 RX ORDER — CYCLOBENZAPRINE HCL 10 MG
10 TABLET ORAL 3 TIMES DAILY PRN
Status: DISCONTINUED | OUTPATIENT
Start: 2017-05-19 | End: 2017-05-21 | Stop reason: HOSPADM

## 2017-05-19 RX ADMIN — CYCLOBENZAPRINE HYDROCHLORIDE 10 MG: 10 TABLET, FILM COATED ORAL at 17:58

## 2017-05-19 RX ADMIN — LOSARTAN POTASSIUM 100 MG: 50 TABLET, FILM COATED ORAL at 09:15

## 2017-05-19 RX ADMIN — SODIUM CHLORIDE: 9 INJECTION, SOLUTION INTRAVENOUS at 06:34

## 2017-05-19 RX ADMIN — DEXTROAMPHETAMINE SACCHARATE, AMPHETAMINE ASPARTATE, DEXTROAMPHETAMINE SULFATE AND AMPHETAMINE SULFATE 5 MG: 2.5; 2.5; 2.5; 2.5 TABLET ORAL at 17:58

## 2017-05-19 RX ADMIN — STANDARDIZED SENNA CONCENTRATE AND DOCUSATE SODIUM 2 TABLET: 8.6; 5 TABLET, FILM COATED ORAL at 09:20

## 2017-05-19 RX ADMIN — OXYCODONE AND ACETAMINOPHEN 1 TABLET: 7.5; 325 TABLET ORAL at 04:26

## 2017-05-19 RX ADMIN — AMPICILLIN SODIUM AND SULBACTAM SODIUM 3 G: 2; 1 INJECTION, POWDER, FOR SOLUTION INTRAMUSCULAR; INTRAVENOUS at 06:34

## 2017-05-19 RX ADMIN — AMPICILLIN SODIUM AND SULBACTAM SODIUM 3 G: 2; 1 INJECTION, POWDER, FOR SOLUTION INTRAMUSCULAR; INTRAVENOUS at 23:33

## 2017-05-19 RX ADMIN — VANCOMYCIN HYDROCHLORIDE 1800 MG: 100 INJECTION, POWDER, LYOPHILIZED, FOR SOLUTION INTRAVENOUS at 04:24

## 2017-05-19 RX ADMIN — OXYCODONE AND ACETAMINOPHEN 1 TABLET: 7.5; 325 TABLET ORAL at 12:34

## 2017-05-19 RX ADMIN — METOPROLOL TARTRATE 25 MG: 25 TABLET, FILM COATED ORAL at 12:34

## 2017-05-19 RX ADMIN — IBUPROFEN 600 MG: 400 TABLET, FILM COATED ORAL at 09:16

## 2017-05-19 RX ADMIN — TRAMADOL HYDROCHLORIDE 50 MG: 50 TABLET, COATED ORAL at 19:40

## 2017-05-19 RX ADMIN — AMPICILLIN SODIUM AND SULBACTAM SODIUM 3 G: 2; 1 INJECTION, POWDER, FOR SOLUTION INTRAMUSCULAR; INTRAVENOUS at 17:57

## 2017-05-19 RX ADMIN — DEXTROAMPHETAMINE SACCHARATE, AMPHETAMINE ASPARTATE, DEXTROAMPHETAMINE SULFATE AND AMPHETAMINE SULFATE 5 MG: 2.5; 2.5; 2.5; 2.5 TABLET ORAL at 04:24

## 2017-05-19 RX ADMIN — VANCOMYCIN HYDROCHLORIDE 1800 MG: 100 INJECTION, POWDER, LYOPHILIZED, FOR SOLUTION INTRAVENOUS at 21:43

## 2017-05-19 RX ADMIN — METOPROLOL TARTRATE 25 MG: 25 TABLET, FILM COATED ORAL at 19:40

## 2017-05-19 RX ADMIN — VANCOMYCIN HYDROCHLORIDE 1800 MG: 100 INJECTION, POWDER, LYOPHILIZED, FOR SOLUTION INTRAVENOUS at 15:08

## 2017-05-19 RX ADMIN — ENOXAPARIN SODIUM 40 MG: 100 INJECTION SUBCUTANEOUS at 09:17

## 2017-05-19 RX ADMIN — AMPICILLIN SODIUM AND SULBACTAM SODIUM 3 G: 2; 1 INJECTION, POWDER, FOR SOLUTION INTRAMUSCULAR; INTRAVENOUS at 12:35

## 2017-05-19 RX ADMIN — IBUPROFEN 600 MG: 400 TABLET, FILM COATED ORAL at 17:55

## 2017-05-19 RX ADMIN — PREDNISONE 10 MG: 20 TABLET ORAL at 09:15

## 2017-05-19 ASSESSMENT — ENCOUNTER SYMPTOMS
DOUBLE VISION: 0
BLURRED VISION: 0
SPUTUM PRODUCTION: 0
HEADACHES: 0
NAUSEA: 0
PHOTOPHOBIA: 0
HEARTBURN: 0
VOMITING: 0
TINGLING: 0
PALPITATIONS: 0
DIZZINESS: 0
WEIGHT LOSS: 0
TREMORS: 0
FEVER: 0
COUGH: 0
HEMOPTYSIS: 0
CHILLS: 0
ORTHOPNEA: 0

## 2017-05-19 ASSESSMENT — PAIN SCALES - GENERAL
PAINLEVEL_OUTOF10: 6
PAINLEVEL_OUTOF10: 6
PAINLEVEL_OUTOF10: 5
PAINLEVEL_OUTOF10: 3
PAINLEVEL_OUTOF10: 5
PAINLEVEL_OUTOF10: 3

## 2017-05-19 NOTE — PROGRESS NOTES
"Pharmacy Kinetics 36 y.o. male on vancomycin day # 2 2017    Currently on Vancomycin 1800 mg iv q8hr (0500, 1300, 2100)    Indication for Treatment: SSTI - LLE cellulitis    Pertinent history per medical record: Admitted on 2017. Patient is a 36-year-old male had right knee pain and swelling attributed to bad back compensating gait, evaluated here at [facility] with complaints of left medial ankle region pain, swelling, and redness. Patient reports he has had some joint swelling of lower extremities recently with increased redness, pain, swelling, warmth to the medial ankle area. Patient reports no fevers, chills.  Has had difficulty ambulating with his left ankle pain and swelling.    Other antibiotics: unasyn 3 g IV q6h    Allergies: Review of patient's allergies indicates no known allergies.     List concerns for renal function: BMI 32     Pertinent cultures to date:   none    Recent Labs      17   0820   WBC  17.7*   NEUTSPOLYS  77.90*     Recent Labs      17   0820  17   0219   BUN  13  11   CREATININE  0.69  0.67   ALBUMIN  3.3   --      Recent Labs      17   1213   VANCOTROUGH  12.0     Intake/Output Summary (Last 24 hours) at 17 1414  Last data filed at 17 1000   Gross per 24 hour   Intake    950 ml   Output   4300 ml   Net  -3350 ml      Blood pressure 121/78, pulse 106, temperature 37.7 °C (99.8 °F), resp. rate 16, height 2.007 m (6' 7\"), weight 127.007 kg (280 lb), SpO2 94 %. Temp (24hrs), Av.1 °C (98.7 °F), Min:36.3 °C (97.3 °F), Max:37.7 °C (99.8 °F)      A/P   1. Vancomycin dose change: not indicated  2. Next vancomycin level: 3-4 days  3. Goal trough: 12-16 mcg/ml  4. Comments: no new concerns for renal function.  Level at goal.  Will continue current dosing and plan for another level in a few days (or sooner if renal function changes).  Discussed risk of MRSA with MD who believes pt does have a risk for MRSA given the chronic nature of his infection.  " Appropriate to continue vancomycin therapy at this time.  Pharmacy will continue to monitor and adjust dosing if needed.      Silva Carcamo, PharmD

## 2017-05-19 NOTE — PROGRESS NOTES
Hospital Medicine Progress Note, Adult, Complex               Author: Danielle Mcdermott Date & Time created: 5/19/2017  9:53 AM     Interval History:  36YR OLD M WITH LEFT ANKEL CELLULITIS    Review of Systems:  Review of Systems   Constitutional: Negative for fever, chills and weight loss.   HENT: Negative for hearing loss and tinnitus.    Eyes: Negative for blurred vision, double vision and photophobia.   Respiratory: Negative for cough, hemoptysis and sputum production.    Cardiovascular: Negative for chest pain, palpitations and orthopnea.   Gastrointestinal: Negative for heartburn, nausea and vomiting.   Genitourinary: Negative for dysuria, urgency and frequency.   Musculoskeletal: Positive for joint pain (LEFT ANKEL SWELLING).   Skin: Negative for itching and rash.   Neurological: Negative for dizziness, tingling, tremors and headaches.       Physical Exam:  Physical Exam   Constitutional: He is oriented to person, place, and time. No distress.   HENT:   Head: Normocephalic and atraumatic.   Eyes: Right eye exhibits no discharge. Left eye exhibits no discharge.   Neck: Neck supple. No JVD present.   Cardiovascular: Normal rate and regular rhythm.    Pulmonary/Chest: No stridor. No respiratory distress. He has no wheezes. He has no rales.   Abdominal: Soft. He exhibits no distension. There is no tenderness. There is no rebound.   Musculoskeletal: He exhibits edema (LEFT ANKEL).   Neurological: He is alert and oriented to person, place, and time.   Skin: Skin is warm and dry. He is not diaphoretic. No erythema.       Labs:        Invalid input(s): [id number]      Recent Labs      05/18/17   0820  05/19/17 0219   SODIUM  132*  138   POTASSIUM  4.1  4.0   CHLORIDE  100  103   CO2  25  26   BUN  13  11   CREATININE  0.69  0.67   CALCIUM  9.2  8.9     Recent Labs      05/18/17   0820  05/19/17 0219   ALTSGPT  76*   --    ASTSGOT  27   --    ALKPHOSPHAT  103*   --    TBILIRUBIN  0.9   --    GLUCOSE  104*   94     Recent Labs      17   0820   RBC  4.57*   HEMOGLOBIN  13.5*   HEMATOCRIT  40.3*   PLATELETCT  471*   PROTHROMBTM  15.1*   APTT  35.6   INR  1.15*     Recent Labs      17   0820   WBC  17.7*   NEUTSPOLYS  77.90*   LYMPHOCYTES  9.90*   MONOCYTES  9.40   EOSINOPHILS  0.50   BASOPHILS  0.50   ASTSGOT  27   ALTSGPT  76*   ALKPHOSPHAT  103*   TBILIRUBIN  0.9           Hemodynamics:  Temp (24hrs), Av.9 °C (98.5 °F), Min:36.3 °C (97.3 °F), Max:37.7 °C (99.8 °F)  Temperature: 37.7 °C (99.8 °F)  Pulse  Av  Min: 97  Max: 126   Blood Pressure: 121/78 mmHg, NIBP: 114/69 mmHg     Respiratory:    Respiration: 16, Pulse Oximetry: 94 %        RUL Breath Sounds: Clear, RML Breath Sounds: Clear, RLL Breath Sounds: Clear, TARYN Breath Sounds: Clear, LLL Breath Sounds: Clear  Fluids:    Intake/Output Summary (Last 24 hours) at 17 0953  Last data filed at 17 0400   Gross per 24 hour   Intake    800 ml   Output   3650 ml   Net  -2850 ml       GI/Nutrition:  Orders Placed This Encounter   Procedures   • Diet Order     Standing Status: Standing      Number of Occurrences: 1      Standing Expiration Date:      Order Specific Question:  Diet:     Answer:  Regular [1]     Medical Decision Making, by Problem:  Active Hospital Problems    Diagnosis   • Left leg cellulitis [L03.116]     36YR OLD M WITH LEFT ANKEL CELLULITIS  BETTER  UNASYN  VANCOMYCIN AS PER PHARMACY  ANKEL XRAY IS NOTED  ULTRASOUND NEGATIVE FOR DVT  PT AND OT EVAL    HTN  LOSARTAN  LOPRESSOR    ADHD  CONTINIUE WITH ADDERALL      Crespo catheter: No Crespo

## 2017-05-19 NOTE — PROGRESS NOTES
"Pharmacy Kinetics 36 y.o. male on vancomycin day #1 2017    Received vancomycin 3000mg x1 in the ED (loading dose)    Indication for Treatment: Cellulitis    Pertinent history per medical record: Admitted on 2017. Patient is a 36-year-old male had right knee pain and swelling attributed to bad back compensating gait, evaluated here at [facility] [place] with complaints of left medial ankle region pain, swelling, and redness. Patient reports he has had some joint swelling of lower extremities recently with increased redness, pain, swelling, warmth to the medial ankle area. Patient reports no fevers, chills.  Has had difficulty ambulating with his left ankle pain and swelling.    Other antibiotics: Unasyn 3g q6hr    Allergies: Review of patient's allergies indicates no known allergies.     List concerns for renal function: obesity    Pertinent cultures to date:   17 blood cultures x2    Recent Labs      17   0820   WBC  17.7*   NEUTSPOLYS  77.90*     Recent Labs      17   0820   BUN  13   CREATININE  0.69   ALBUMIN  3.3     No results for input(s): VANCOTROUGH, VANCOPEAK, VANCORANDOM in the last 72 hours.  Intake/Output Summary (Last 24 hours) at 17 1704  Last data filed at 17 1628   Gross per 24 hour   Intake    800 ml   Output   1300 ml   Net   -500 ml      Blood pressure 126/76, pulse 112, temperature 36.4 °C (97.6 °F), resp. rate 18, height 2.007 m (6' 7\"), weight 127.007 kg (280 lb), SpO2 96 %. Temp (24hrs), Av.7 °C (98.1 °F), Min:36.4 °C (97.6 °F), Max:37 °C (98.6 °F)      A/P   1. Vancomycin dose change: Start vancomycin 14mg/kg (1800mg) q8hr per protocol - verified patient height and weight in patient room.  2. Next vancomycin level: Tomorrow prior to the 4th total dose @1230  3. Goal trough: 12-16 mcg/ml  4. Comments: Patient tachycardic, lactic acid elevated, otherwise stable. Afebrile with leukocytosis. Relatively low risk of accumulation. Patient young. BMI 30. Dosing as " above. Patient wound appears low risk of MRSA. Narrow therapy as able. Pharmacy will follow.    Yoav Vázquez, IsraelD, BCPS

## 2017-05-20 ENCOUNTER — APPOINTMENT (OUTPATIENT)
Dept: RADIOLOGY | Facility: MEDICAL CENTER | Age: 37
DRG: 872 | End: 2017-05-20
Attending: HOSPITALIST
Payer: COMMERCIAL

## 2017-05-20 LAB
ANION GAP SERPL CALC-SCNC: 8 MMOL/L (ref 0–11.9)
BASOPHILS # BLD AUTO: 0.5 % (ref 0–1.8)
BASOPHILS # BLD: 0.07 K/UL (ref 0–0.12)
BUN SERPL-MCNC: 14 MG/DL (ref 8–22)
CALCIUM SERPL-MCNC: 9.1 MG/DL (ref 8.5–10.5)
CHLORIDE SERPL-SCNC: 101 MMOL/L (ref 96–112)
CO2 SERPL-SCNC: 24 MMOL/L (ref 20–33)
CREAT SERPL-MCNC: 0.68 MG/DL (ref 0.5–1.4)
EOSINOPHIL # BLD AUTO: 0.16 K/UL (ref 0–0.51)
EOSINOPHIL NFR BLD: 1 % (ref 0–6.9)
ERYTHROCYTE [DISTWIDTH] IN BLOOD BY AUTOMATED COUNT: 41.3 FL (ref 35.9–50)
GFR SERPL CREATININE-BSD FRML MDRD: >60 ML/MIN/1.73 M 2
GLUCOSE SERPL-MCNC: 88 MG/DL (ref 65–99)
HCT VFR BLD AUTO: 42.4 % (ref 42–52)
HGB BLD-MCNC: 13.6 G/DL (ref 14–18)
IMM GRANULOCYTES # BLD AUTO: 0.19 K/UL (ref 0–0.11)
IMM GRANULOCYTES NFR BLD AUTO: 1.2 % (ref 0–0.9)
LYMPHOCYTES # BLD AUTO: 2.33 K/UL (ref 1–4.8)
LYMPHOCYTES NFR BLD: 15.2 % (ref 22–41)
MCH RBC QN AUTO: 29 PG (ref 27–33)
MCHC RBC AUTO-ENTMCNC: 32.1 G/DL (ref 33.7–35.3)
MCV RBC AUTO: 90.4 FL (ref 81.4–97.8)
MONOCYTES # BLD AUTO: 1.72 K/UL (ref 0–0.85)
MONOCYTES NFR BLD AUTO: 11.2 % (ref 0–13.4)
NEUTROPHILS # BLD AUTO: 10.88 K/UL (ref 1.82–7.42)
NEUTROPHILS NFR BLD: 70.9 % (ref 44–72)
NRBC # BLD AUTO: 0 K/UL
NRBC BLD AUTO-RTO: 0 /100 WBC
PLATELET # BLD AUTO: 518 K/UL (ref 164–446)
PMV BLD AUTO: 9.5 FL (ref 9–12.9)
POTASSIUM SERPL-SCNC: 4 MMOL/L (ref 3.6–5.5)
RBC # BLD AUTO: 4.69 M/UL (ref 4.7–6.1)
SODIUM SERPL-SCNC: 133 MMOL/L (ref 135–145)
WBC # BLD AUTO: 15.4 K/UL (ref 4.8–10.8)

## 2017-05-20 PROCEDURE — 700117 HCHG RX CONTRAST REV CODE 255: Performed by: HOSPITALIST

## 2017-05-20 PROCEDURE — 99233 SBSQ HOSP IP/OBS HIGH 50: CPT | Performed by: HOSPITALIST

## 2017-05-20 PROCEDURE — 700102 HCHG RX REV CODE 250 W/ 637 OVERRIDE(OP): Performed by: FAMILY MEDICINE

## 2017-05-20 PROCEDURE — 700105 HCHG RX REV CODE 258

## 2017-05-20 PROCEDURE — A9270 NON-COVERED ITEM OR SERVICE: HCPCS | Performed by: HOSPITALIST

## 2017-05-20 PROCEDURE — 85025 COMPLETE CBC W/AUTO DIFF WBC: CPT

## 2017-05-20 PROCEDURE — 80048 BASIC METABOLIC PNL TOTAL CA: CPT

## 2017-05-20 PROCEDURE — 770006 HCHG ROOM/CARE - MED/SURG/GYN SEMI*

## 2017-05-20 PROCEDURE — 700111 HCHG RX REV CODE 636 W/ 250 OVERRIDE (IP): Performed by: HOSPITALIST

## 2017-05-20 PROCEDURE — 700105 HCHG RX REV CODE 258: Performed by: HOSPITALIST

## 2017-05-20 PROCEDURE — A9270 NON-COVERED ITEM OR SERVICE: HCPCS | Performed by: FAMILY MEDICINE

## 2017-05-20 PROCEDURE — 700105 HCHG RX REV CODE 258: Performed by: EMERGENCY MEDICINE

## 2017-05-20 PROCEDURE — 700102 HCHG RX REV CODE 250 W/ 637 OVERRIDE(OP): Performed by: HOSPITALIST

## 2017-05-20 PROCEDURE — 36415 COLL VENOUS BLD VENIPUNCTURE: CPT

## 2017-05-20 PROCEDURE — 73701 CT LOWER EXTREMITY W/DYE: CPT | Mod: LT

## 2017-05-20 PROCEDURE — 700111 HCHG RX REV CODE 636 W/ 250 OVERRIDE (IP)

## 2017-05-20 RX ADMIN — OXYCODONE AND ACETAMINOPHEN 1 TABLET: 7.5; 325 TABLET ORAL at 19:46

## 2017-05-20 RX ADMIN — VANCOMYCIN HYDROCHLORIDE 1800 MG: 100 INJECTION, POWDER, LYOPHILIZED, FOR SOLUTION INTRAVENOUS at 15:02

## 2017-05-20 RX ADMIN — AMPICILLIN SODIUM AND SULBACTAM SODIUM 3 G: 2; 1 INJECTION, POWDER, FOR SOLUTION INTRAMUSCULAR; INTRAVENOUS at 17:48

## 2017-05-20 RX ADMIN — OXYCODONE AND ACETAMINOPHEN 1 TABLET: 7.5; 325 TABLET ORAL at 08:54

## 2017-05-20 RX ADMIN — CYCLOBENZAPRINE HYDROCHLORIDE 10 MG: 10 TABLET, FILM COATED ORAL at 20:56

## 2017-05-20 RX ADMIN — ENOXAPARIN SODIUM 40 MG: 100 INJECTION SUBCUTANEOUS at 08:23

## 2017-05-20 RX ADMIN — DEXTROAMPHETAMINE SACCHARATE, AMPHETAMINE ASPARTATE, DEXTROAMPHETAMINE SULFATE AND AMPHETAMINE SULFATE 5 MG: 2.5; 2.5; 2.5; 2.5 TABLET ORAL at 05:18

## 2017-05-20 RX ADMIN — AMPICILLIN SODIUM AND SULBACTAM SODIUM 3 G: 2; 1 INJECTION, POWDER, FOR SOLUTION INTRAMUSCULAR; INTRAVENOUS at 06:18

## 2017-05-20 RX ADMIN — DEXTROAMPHETAMINE SACCHARATE, AMPHETAMINE ASPARTATE, DEXTROAMPHETAMINE SULFATE AND AMPHETAMINE SULFATE 5 MG: 2.5; 2.5; 2.5; 2.5 TABLET ORAL at 17:48

## 2017-05-20 RX ADMIN — VANCOMYCIN HYDROCHLORIDE 1800 MG: 100 INJECTION, POWDER, LYOPHILIZED, FOR SOLUTION INTRAVENOUS at 20:59

## 2017-05-20 RX ADMIN — VANCOMYCIN HYDROCHLORIDE 1800 MG: 100 INJECTION, POWDER, LYOPHILIZED, FOR SOLUTION INTRAVENOUS at 04:17

## 2017-05-20 RX ADMIN — METOPROLOL TARTRATE 25 MG: 25 TABLET, FILM COATED ORAL at 08:23

## 2017-05-20 RX ADMIN — SODIUM CHLORIDE: 9 INJECTION, SOLUTION INTRAVENOUS at 04:17

## 2017-05-20 RX ADMIN — PREDNISONE 10 MG: 20 TABLET ORAL at 09:00

## 2017-05-20 RX ADMIN — LOSARTAN POTASSIUM 100 MG: 50 TABLET, FILM COATED ORAL at 08:24

## 2017-05-20 RX ADMIN — IOHEXOL 100 ML: 350 INJECTION, SOLUTION INTRAVENOUS at 11:50

## 2017-05-20 RX ADMIN — CYCLOBENZAPRINE HYDROCHLORIDE 10 MG: 10 TABLET, FILM COATED ORAL at 12:37

## 2017-05-20 RX ADMIN — OXYCODONE AND ACETAMINOPHEN 1 TABLET: 7.5; 325 TABLET ORAL at 02:43

## 2017-05-20 RX ADMIN — AMPICILLIN SODIUM AND SULBACTAM SODIUM 3 G: 2; 1 INJECTION, POWDER, FOR SOLUTION INTRAMUSCULAR; INTRAVENOUS at 14:17

## 2017-05-20 RX ADMIN — METOPROLOL TARTRATE 25 MG: 25 TABLET, FILM COATED ORAL at 19:46

## 2017-05-20 RX ADMIN — AMPICILLIN SODIUM AND SULBACTAM SODIUM 3 G: 2; 1 INJECTION, POWDER, FOR SOLUTION INTRAMUSCULAR; INTRAVENOUS at 23:17

## 2017-05-20 ASSESSMENT — ENCOUNTER SYMPTOMS
NAUSEA: 0
ORTHOPNEA: 0
SPUTUM PRODUCTION: 0
HEADACHES: 0
HEMOPTYSIS: 0
FEVER: 0
TINGLING: 0
WEIGHT LOSS: 0
TREMORS: 0
DIZZINESS: 0
COUGH: 0
VOMITING: 0
HEARTBURN: 0
PHOTOPHOBIA: 0
DOUBLE VISION: 0
BLURRED VISION: 0
PALPITATIONS: 0
CHILLS: 0

## 2017-05-20 ASSESSMENT — PAIN SCALES - GENERAL
PAINLEVEL_OUTOF10: 8
PAINLEVEL_OUTOF10: 4
PAINLEVEL_OUTOF10: 4
PAINLEVEL_OUTOF10: 6
PAINLEVEL_OUTOF10: 4
PAINLEVEL_OUTOF10: 5
PAINLEVEL_OUTOF10: 6
PAINLEVEL_OUTOF10: 7

## 2017-05-20 NOTE — PROGRESS NOTES
"Pharmacy Kinetics 36 y.o. male on vancomycin day # 3 2017    Currently on Vancomycin 1800 mg iv q8hr (0500, 1300, 2100)    Indication for Treatment: SSTI - LLE cellulitis    Pertinent history per medical record: Admitted on 2017. Patient is a 36-year-old male had right knee pain and swelling attributed to bad back compensating gait, evaluated here at [facility] with complaints of left medial ankle region pain, swelling, and redness. Patient reports he has had some joint swelling of lower extremities recently with increased redness, pain, swelling, warmth to the medial ankle area. Patient reports no fevers, chills.  Has had difficulty ambulating with his left ankle pain and swelling.    Other antibiotics: unasyn 3 g IV q6h    Allergies: Review of patient's allergies indicates no known allergies.     List concerns for renal function: BMI 32     Pertinent cultures to date:    17: PBC x 2 NGTD    Recent Labs      17   0820  17   0236   WBC  17.7*  15.4*   NEUTSPOLYS  77.90*  70.90     Recent Labs      17   0820  17   0219  17   0236   BUN  13  11  14   CREATININE  0.69  0.67  0.68   ALBUMIN  3.3   --    --      Recent Labs      17   1213   VANCOTROUGH  12.0     Intake/Output Summary (Last 24 hours) at 17 1202  Last data filed at 17 1139   Gross per 24 hour   Intake   2900 ml   Output   2900 ml   Net      0 ml      Blood pressure 120/79, pulse 106, temperature 37.3 °C (99.1 °F), resp. rate 19, height 2.007 m (6' 7\"), weight 127.007 kg (280 lb), SpO2 96 %. Temp (24hrs), Av.9 °C (98.5 °F), Min:36.5 °C (97.7 °F), Max:37.3 °C (99.1 °F)      A/P   1. Vancomycin dose change: no change  2. Next vancomycin level: 2-3 days  3. Goal trough: 12-16 mcg/mL  4. Comments: No new concerns for renal function, labs stable. Blood cultures negative to date. CTM.    Sharonda Fela, PharmD         "

## 2017-05-20 NOTE — CARE PLAN
Problem: Infection  Goal: Will remain free from infection  Outcome: PROGRESSING AS EXPECTED  Pt. Continues on prescribed antibiotics for cellulitis.    Problem: Pain Management  Goal: Pain level will decrease to patient’s comfort goal  Outcome: PROGRESSING AS EXPECTED  Pt. States current pain of 4 which he states he does not need medicated for, pt.'s legs elevated to reduce swelling.

## 2017-05-20 NOTE — PROGRESS NOTES
Pt. Resting quietly in bed listening to headphones and watching a show on his phone.  Pt. Currently states that pain in his right knee is a 4 out of 10 on the 10 point pain scale.  Pt.'s bilateral legs elevated.  Pt. A&O x 4, strong bounding pedal pulses and states that he does not currently have any concerns or needs.  Pt. Continues with IV fluids as ordered per MAR as well as prescribed antibiotics.  Pt. Does have some mild swelling to right knee where pain is present.  Cat scan performed of lower left extremity today and read as stated in EPIC.

## 2017-05-20 NOTE — CARE PLAN
Problem: Knowledge Deficit  Goal: Knowledge of disease process/condition, treatment plan, diagnostic tests, and medications will improve  Outcome: PROGRESSING AS EXPECTED  Plan of care discussed and questions answered.    Problem: Pain Management  Goal: Pain level will decrease to patient’s comfort goal  Intervention: Follow pain managment plan developed in collaboration with patient and Interdisciplinary Team  Comfort level assessed with hourly rounding and prn. pain med. Given as needed.  Intervention: Educate and implement non-pharmacologic comfort measures. Examples: relaxation, distration, play therapy, activity therapy, massage, etc.    05/19/17 1945   OTHER   Intervention Medication (see MAR);Relaxation Technique;Repositioned;Rest;Cold Pack

## 2017-05-20 NOTE — PROGRESS NOTES
Received report from day shift RN. Discussed POC with pt., verbalized understanding, assumed care @1915. A&Ox4. On room air. Lung sounds are clear. Complaining of pain on knee rated 5/10 on pain scale. Medicated appropriately per MAR. 2x assist. Noted some swelling on L ankle and redness. On IV abx. Running NS @100cc/hr. Refusing bed alarm. Safety precautions in place; treaded socks on, call light within reach, personal belongings within reach, upper bed rails up.

## 2017-05-20 NOTE — CARE PLAN
Problem: Infection  Goal: Will remain free from infection  Outcome: PROGRESSING AS EXPECTED  On IV abx    Problem: Pain Management  Goal: Pain level will decrease to patient’s comfort goal  Outcome: PROGRESSING AS EXPECTED  Complained of pain on bilateral knee rated 5/10 on pain scale. Medicated appropriately per MAR. Will continue to monitor.

## 2017-05-20 NOTE — PROGRESS NOTES
A&Ox4. 2x assist to commode. Unsteady. Provided pt. Education regarding importance of bed alarm in safety, verbalized understanding but continues to refuse. Safety precautions in place. Calls appropriately. Hourly rounding done.

## 2017-05-21 ENCOUNTER — APPOINTMENT (OUTPATIENT)
Dept: RADIOLOGY | Facility: MEDICAL CENTER | Age: 37
DRG: 872 | End: 2017-05-21
Attending: HOSPITALIST
Payer: COMMERCIAL

## 2017-05-21 ENCOUNTER — APPOINTMENT (OUTPATIENT)
Dept: RADIOLOGY | Facility: MEDICAL CENTER | Age: 37
DRG: 872 | End: 2017-05-21
Attending: NURSE PRACTITIONER
Payer: COMMERCIAL

## 2017-05-21 VITALS
TEMPERATURE: 97.7 F | OXYGEN SATURATION: 96 % | HEART RATE: 94 BPM | RESPIRATION RATE: 17 BRPM | BODY MASS INDEX: 32.4 KG/M2 | WEIGHT: 280 LBS | HEIGHT: 78 IN | DIASTOLIC BLOOD PRESSURE: 70 MMHG | SYSTOLIC BLOOD PRESSURE: 118 MMHG

## 2017-05-21 PROBLEM — G89.29 CHRONIC BACK PAIN: Chronic | Status: ACTIVE | Noted: 2017-05-21

## 2017-05-21 PROBLEM — F90.9 ADHD (ATTENTION DEFICIT HYPERACTIVITY DISORDER): Chronic | Status: ACTIVE | Noted: 2017-05-21

## 2017-05-21 PROBLEM — M54.9 CHRONIC BACK PAIN: Chronic | Status: ACTIVE | Noted: 2017-05-21

## 2017-05-21 PROBLEM — I10 HTN (HYPERTENSION): Chronic | Status: ACTIVE | Noted: 2017-05-21

## 2017-05-21 LAB
ANION GAP SERPL CALC-SCNC: 9 MMOL/L (ref 0–11.9)
BASOPHILS # BLD AUTO: 0.4 % (ref 0–1.8)
BASOPHILS # BLD: 0.06 K/UL (ref 0–0.12)
BUN SERPL-MCNC: 13 MG/DL (ref 8–22)
CALCIUM SERPL-MCNC: 9.4 MG/DL (ref 8.5–10.5)
CHLORIDE SERPL-SCNC: 99 MMOL/L (ref 96–112)
CO2 SERPL-SCNC: 24 MMOL/L (ref 20–33)
CREAT SERPL-MCNC: 0.75 MG/DL (ref 0.5–1.4)
EOSINOPHIL # BLD AUTO: 0.12 K/UL (ref 0–0.51)
EOSINOPHIL NFR BLD: 0.9 % (ref 0–6.9)
ERYTHROCYTE [DISTWIDTH] IN BLOOD BY AUTOMATED COUNT: 40.9 FL (ref 35.9–50)
GFR SERPL CREATININE-BSD FRML MDRD: >60 ML/MIN/1.73 M 2
GLUCOSE SERPL-MCNC: 99 MG/DL (ref 65–99)
HCT VFR BLD AUTO: 42.5 % (ref 42–52)
HGB BLD-MCNC: 13.8 G/DL (ref 14–18)
IMM GRANULOCYTES # BLD AUTO: 0.18 K/UL (ref 0–0.11)
IMM GRANULOCYTES NFR BLD AUTO: 1.3 % (ref 0–0.9)
LYMPHOCYTES # BLD AUTO: 1.58 K/UL (ref 1–4.8)
LYMPHOCYTES NFR BLD: 11.6 % (ref 22–41)
MCH RBC QN AUTO: 29.1 PG (ref 27–33)
MCHC RBC AUTO-ENTMCNC: 32.5 G/DL (ref 33.7–35.3)
MCV RBC AUTO: 89.7 FL (ref 81.4–97.8)
MONOCYTES # BLD AUTO: 1.52 K/UL (ref 0–0.85)
MONOCYTES NFR BLD AUTO: 11.1 % (ref 0–13.4)
NEUTROPHILS # BLD AUTO: 10.18 K/UL (ref 1.82–7.42)
NEUTROPHILS NFR BLD: 74.7 % (ref 44–72)
NRBC # BLD AUTO: 0 K/UL
NRBC BLD AUTO-RTO: 0 /100 WBC
PLATELET # BLD AUTO: 503 K/UL (ref 164–446)
PMV BLD AUTO: 9.4 FL (ref 9–12.9)
POTASSIUM SERPL-SCNC: 4 MMOL/L (ref 3.6–5.5)
RBC # BLD AUTO: 4.74 M/UL (ref 4.7–6.1)
SODIUM SERPL-SCNC: 132 MMOL/L (ref 135–145)
WBC # BLD AUTO: 13.6 K/UL (ref 4.8–10.8)

## 2017-05-21 PROCEDURE — 700105 HCHG RX REV CODE 258: Performed by: HOSPITALIST

## 2017-05-21 PROCEDURE — A9270 NON-COVERED ITEM OR SERVICE: HCPCS | Performed by: HOSPITALIST

## 2017-05-21 PROCEDURE — 3E0234Z INTRODUCTION OF SERUM, TOXOID AND VACCINE INTO MUSCLE, PERCUTANEOUS APPROACH: ICD-10-PCS | Performed by: HOSPITALIST

## 2017-05-21 PROCEDURE — A9270 NON-COVERED ITEM OR SERVICE: HCPCS | Performed by: FAMILY MEDICINE

## 2017-05-21 PROCEDURE — 700111 HCHG RX REV CODE 636 W/ 250 OVERRIDE (IP): Performed by: HOSPITALIST

## 2017-05-21 PROCEDURE — 85025 COMPLETE CBC W/AUTO DIFF WBC: CPT

## 2017-05-21 PROCEDURE — 36415 COLL VENOUS BLD VENIPUNCTURE: CPT

## 2017-05-21 PROCEDURE — 700117 HCHG RX CONTRAST REV CODE 255: Performed by: HOSPITALIST

## 2017-05-21 PROCEDURE — 99239 HOSP IP/OBS DSCHRG MGMT >30: CPT | Performed by: HOSPITALIST

## 2017-05-21 PROCEDURE — 700112 HCHG RX REV CODE 229: Performed by: HOSPITALIST

## 2017-05-21 PROCEDURE — 80048 BASIC METABOLIC PNL TOTAL CA: CPT

## 2017-05-21 PROCEDURE — 73701 CT LOWER EXTREMITY W/DYE: CPT | Mod: LT

## 2017-05-21 PROCEDURE — 700102 HCHG RX REV CODE 250 W/ 637 OVERRIDE(OP): Performed by: HOSPITALIST

## 2017-05-21 PROCEDURE — 90715 TDAP VACCINE 7 YRS/> IM: CPT | Performed by: HOSPITALIST

## 2017-05-21 PROCEDURE — 90471 IMMUNIZATION ADMIN: CPT

## 2017-05-21 PROCEDURE — 700105 HCHG RX REV CODE 258

## 2017-05-21 PROCEDURE — 700102 HCHG RX REV CODE 250 W/ 637 OVERRIDE(OP): Performed by: FAMILY MEDICINE

## 2017-05-21 PROCEDURE — 700111 HCHG RX REV CODE 636 W/ 250 OVERRIDE (IP)

## 2017-05-21 RX ORDER — AMOXICILLIN AND CLAVULANATE POTASSIUM 875; 125 MG/1; MG/1
1 TABLET, FILM COATED ORAL 2 TIMES DAILY
Qty: 16 TAB | Refills: 0 | Status: SHIPPED | OUTPATIENT
Start: 2017-05-21 | End: 2017-05-29

## 2017-05-21 RX ORDER — DOXYCYCLINE HYCLATE 100 MG
100 TABLET ORAL 2 TIMES DAILY
Qty: 16 TAB | Refills: 0 | Status: SHIPPED | OUTPATIENT
Start: 2017-05-21 | End: 2017-05-29

## 2017-05-21 RX ORDER — LOSARTAN POTASSIUM 100 MG/1
100 TABLET ORAL DAILY
Qty: 30 TAB | Refills: 1 | Status: SHIPPED | OUTPATIENT
Start: 2017-05-21 | End: 2017-06-23 | Stop reason: SDUPTHER

## 2017-05-21 RX ADMIN — IBUPROFEN 600 MG: 400 TABLET, FILM COATED ORAL at 04:50

## 2017-05-21 RX ADMIN — IOHEXOL 100 ML: 350 INJECTION, SOLUTION INTRAVENOUS at 10:13

## 2017-05-21 RX ADMIN — AMPICILLIN SODIUM AND SULBACTAM SODIUM 3 G: 2; 1 INJECTION, POWDER, FOR SOLUTION INTRAMUSCULAR; INTRAVENOUS at 06:10

## 2017-05-21 RX ADMIN — DEXTROAMPHETAMINE SACCHARATE, AMPHETAMINE ASPARTATE, DEXTROAMPHETAMINE SULFATE AND AMPHETAMINE SULFATE 5 MG: 2.5; 2.5; 2.5; 2.5 TABLET ORAL at 04:50

## 2017-05-21 RX ADMIN — ENOXAPARIN SODIUM 40 MG: 100 INJECTION SUBCUTANEOUS at 08:20

## 2017-05-21 RX ADMIN — VANCOMYCIN HYDROCHLORIDE 1800 MG: 100 INJECTION, POWDER, LYOPHILIZED, FOR SOLUTION INTRAVENOUS at 04:09

## 2017-05-21 RX ADMIN — OXYCODONE AND ACETAMINOPHEN 1 TABLET: 7.5; 325 TABLET ORAL at 01:56

## 2017-05-21 RX ADMIN — CLOSTRIDIUM TETANI TOXOID ANTIGEN (FORMALDEHYDE INACTIVATED), CORYNEBACTERIUM DIPHTHERIAE TOXOID ANTIGEN (FORMALDEHYDE INACTIVATED), BORDETELLA PERTUSSIS TOXOID ANTIGEN (GLUTARALDEHYDE INACTIVATED), BORDETELLA PERTUSSIS FILAMENTOUS HEMAGGLUTININ ANTIGEN (FORMALDEHYDE INACTIVATED), BORDETELLA PERTUSSIS PERTACTIN ANTIGEN, AND BORDETELLA PERTUSSIS FIMBRIAE 2/3 ANTIGEN 0.5 ML: 5; 2; 2.5; 5; 3; 5 INJECTION, SUSPENSION INTRAMUSCULAR at 12:29

## 2017-05-21 RX ADMIN — METOPROLOL TARTRATE 25 MG: 25 TABLET, FILM COATED ORAL at 08:21

## 2017-05-21 RX ADMIN — CYCLOBENZAPRINE HYDROCHLORIDE 10 MG: 10 TABLET, FILM COATED ORAL at 04:50

## 2017-05-21 RX ADMIN — TRAMADOL HYDROCHLORIDE 50 MG: 50 TABLET, COATED ORAL at 12:33

## 2017-05-21 RX ADMIN — LOSARTAN POTASSIUM 100 MG: 50 TABLET, FILM COATED ORAL at 08:20

## 2017-05-21 ASSESSMENT — PAIN SCALES - GENERAL
PAINLEVEL_OUTOF10: 6
PAINLEVEL_OUTOF10: 5
PAINLEVEL_OUTOF10: 4
PAINLEVEL_OUTOF10: 8
PAINLEVEL_OUTOF10: 0

## 2017-05-21 ASSESSMENT — LIFESTYLE VARIABLES: EVER_SMOKED: NEVER

## 2017-05-21 NOTE — PROGRESS NOTES
A&Ox4. 1x assist to commode. Has generalized weakness and pain when ambulating. Provided pt. Education regarding importance of bed alarm in safety, verbalized understanding but continues to refuse. Safety precautions in place. Calls appropriately. Hourly rounding done.

## 2017-05-21 NOTE — CARE PLAN
Problem: Pain Management  Goal: Pain level will decrease to patient’s comfort goal  Outcome: PROGRESSING AS EXPECTED  Pt. Doing well with ambulation and showering with assistance of CNA with minimal pain.    Problem: Mobility  Goal: Risk for activity intolerance will decrease  Outcome: PROGRESSING AS EXPECTED  Pt. Asking to get up to shower and does well doing with ambulation.

## 2017-05-21 NOTE — PROGRESS NOTES
Pt.'s  states that pt. Is not going back to [facility] doctor as they are moving out here, they are made aware that this RN is able to call and set up a follow up appointment for him with a [facility] physician as it is very important that he seeks follow up, but they refuse and state that they plan to investigate and and select a doctor on their own after researching.

## 2017-05-21 NOTE — CARE PLAN
Problem: Infection  Goal: Will remain free from infection  Outcome: PROGRESSING AS EXPECTED  On IV abx    Problem: Pain Management  Goal: Pain level will decrease to patient’s comfort goal  Outcome: PROGRESSING AS EXPECTED  Complained on right knee pain rated 6/10 on pain scale. Medicated appropriately per MAR. Will continue to monitor.

## 2017-05-21 NOTE — PROGRESS NOTES
Received report from day shift RN. Discussed POC with pt., verbalized understanding, assumed care @1915. A&Ox4. On room air. Tolerating well. Complained of right knee pain rated 6/10 on pain scale. Medicated appropriately per MAR. Will continue to monitor. Noted redness, +1 swelling on L ankle. Knee brace on right knee. On IV abx. BM today. 1x assist to commode. Has generalized weakness and pain when getting out of bed. Refusing bed alarm. Safety precautions in place; treaded socks on, call light within reach, personal belongings within reach, upper bed rails up.

## 2017-05-21 NOTE — PROGRESS NOTES
Pt. Asking to get up and shower, Dr. Caballero aware and states that this is ok.  Pt. Does well with ambulation and denies pain this morning.  Some moderate swelling continues to left lower extremity, but pt. Appears to be progressing with ambulation.

## 2017-05-21 NOTE — DISCHARGE INSTRUCTIONS
Discharge Instructions    Discharged to home by car with relative. Discharged via wheelchair, hospital escort: Yes.  Special equipment needed: Crutches    Be sure to schedule a follow-up appointment with your primary care doctor or any specialists as instructed.     Discharge Plan:   Diet Plan: Discussed  Activity Level: Discussed  Confirmed Follow up Appointment: Patient to Call and Schedule Appointment  Confirmed Symptoms Management: Discussed  Medication Reconciliation Updated: Yes  Influenza Vaccine Indication: Patient Refuses    I understand that a diet low in cholesterol, fat, and sodium is recommended for good health. Unless I have been given specific instructions below for another diet, I accept this instruction as my diet prescription.   Other diet: Regular      · Is patient discharged on Warfarin / Coumadin?   No     · Is patient Post Blood Transfusion?  No    Depression / Suicide Risk    As you are discharged from this [facility] facility, it is important to learn how to keep safe from harming yourself.    Recognize the warning signs:  · Abrupt changes in personality, positive or negative- including increase in energy   · Giving away possessions  · Change in eating patterns- significant weight changes-  positive or negative  · Change in sleeping patterns- unable to sleep or sleeping all the time   · Unwillingness or inability to communicate  · Depression  · Unusual sadness, discouragement and loneliness  · Talk of wanting to die  · Neglect of personal appearance   · Rebelliousness- reckless behavior  · Withdrawal from people/activities they love  · Confusion- inability to concentrate     If you or a loved one observes any of these behaviors or has concerns about self-harm, here's what you can do:  · Talk about it- your feelings and reasons for harming yourself  · Remove any means that you might use to hurt yourself (examples: pills, rope, extension cords, firearm)  · Get professional help from the  community (Mental Health, Substance Abuse, psychological counseling)  · Do not be alone:Call your Safe Contact- someone whom you trust who will be there for you.  · Call your local CRISIS HOTLINE 730-5958 or 023-778-6613  · Call your local Children's Mobile Crisis Response Team [place] (538) 252-2745 or www.[employer]  · Call the toll free National Suicide Prevention Hotlines   · National Suicide Prevention Lifeline 460-291-POKJ (2673)  · burrp! Hope Line Network 800-SUICIDE (439-9242)            Cellulitis  Cellulitis is an infection of the skin and the tissue beneath it. The infected area is usually red and tender. Cellulitis occurs most often in the arms and lower legs.   CAUSES   Cellulitis is caused by bacteria that enter the skin through cracks or cuts in the skin. The most common types of bacteria that cause cellulitis are staphylococci and streptococci.  SIGNS AND SYMPTOMS   · Redness and warmth.  · Swelling.  · Tenderness or pain.  · Fever.  DIAGNOSIS   Your health care provider can usually determine what is wrong based on a physical exam. Blood tests may also be done.  TREATMENT   Treatment usually involves taking an antibiotic medicine.  HOME CARE INSTRUCTIONS   2. Take your antibiotic medicine as directed by your health care provider. Finish the antibiotic even if you start to feel better.  3. Keep the infected arm or leg elevated to reduce swelling.  4. Apply a warm cloth to the affected area up to 4 times per day to relieve pain.  5. Take medicines only as directed by your health care provider.  6. Keep all follow-up visits as directed by your health care provider.  SEEK MEDICAL CARE IF:   2. You notice red streaks coming from the infected area.  3. Your red area gets larger or turns dark in color.  4. Your bone or joint underneath the infected area becomes painful after the skin has healed.  5. Your infection returns in the same area or another area.  6. You notice a swollen bump in the  infected area.  7. You develop new symptoms.  8. You have a fever.  SEEK IMMEDIATE MEDICAL CARE IF:   2. You feel very sleepy.  3. You develop vomiting or diarrhea.  4. You have a general ill feeling (malaise) with muscle aches and pains.  MAKE SURE YOU:   2. Understand these instructions.  3. Will watch your condition.  4. Will get help right away if you are not doing well or get worse.     This information is not intended to replace advice given to you by your health care provider. Make sure you discuss any questions you have with your health care provider.     Document Released: 09/27/2006 Document Revised: 01/08/2016 Document Reviewed: 03/04/2013  [employer] Interactive Patient Education ©2016 [employer] Inc.

## 2017-05-21 NOTE — PROGRESS NOTES
Discharging patient home per physician order, discharged with no needs.  Pt. Demonstrated understanding of discharge instructions and importance of following up with doctor in 10 days for cellulitis.  PIV removed and to be discharge home with .

## 2017-05-22 NOTE — DISCHARGE SUMMARY
DATE OF ADMISSION:  05/18/2017    DATE OF DISCHARGE:  05/21/2017    PRIMARY CARE PHYSICIAN:  None established.  He will be given a primary care   appointment by our schedulers prior to discharge.    CONSULTS:  None.    PROCEDURES:  None.    DISCHARGE DIAGNOSES:  Left leg cellulitis    CHRONIC MEDICAL HISTORY:  Hypertension, ADHD, chronic back pain from disk   herniation.    RESULTS REVIEW:  Interval history/exam for day of discharge:    Filed Vitals:    05/21/17 0335 05/21/17 0715 05/21/17 0820 05/21/17 0821   BP: 135/88 118/70 118/70 118/70   Pulse: 92 94  94   Temp: 36.1 °C (97 °F) 36.5 °C (97.7 °F)     Resp: 16 17     Height:       Weight:       SpO2: 94% 96%       Weight/BMI: Body mass index is 31.53 kg/(m^2).         Most Recent Labs:    Lab Results   Component Value Date/Time    WBC 13.6* 05/21/2017 03:16 AM    RBC 4.74 05/21/2017 03:16 AM    HEMOGLOBIN 13.8* 05/21/2017 03:16 AM    HEMATOCRIT 42.5 05/21/2017 03:16 AM    MCV 89.7 05/21/2017 03:16 AM    MCH 29.1 05/21/2017 03:16 AM    MCHC 32.5* 05/21/2017 03:16 AM    MPV 9.4 05/21/2017 03:16 AM    NEUTROPHILS-POLYS 74.70* 05/21/2017 03:16 AM    LYMPHOCYTES 11.60* 05/21/2017 03:16 AM    MONOCYTES 11.10 05/21/2017 03:16 AM    EOSINOPHILS 0.90 05/21/2017 03:16 AM    BASOPHILS 0.40 05/21/2017 03:16 AM      Lab Results   Component Value Date/Time    SODIUM 132* 05/21/2017 03:16 AM    POTASSIUM 4.0 05/21/2017 03:16 AM    CHLORIDE 99 05/21/2017 03:16 AM    CO2 24 05/21/2017 03:16 AM    GLUCOSE 99 05/21/2017 03:16 AM    BUN 13 05/21/2017 03:16 AM    CREATININE 0.75 05/21/2017 03:16 AM      Lab Results   Component Value Date/Time    ALT(SGPT) 76* 05/18/2017 08:20 AM    AST(SGOT) 27 05/18/2017 08:20 AM    ALKALINE PHOSPHATASE 103* 05/18/2017 08:20 AM    TOTAL BILIRUBIN 0.9 05/18/2017 08:20 AM    ALBUMIN 3.3 05/18/2017 08:20 AM    GLOBULIN 4.1* 05/18/2017 08:20 AM    INR 1.15* 05/18/2017 08:20 AM     Lab Results   Component Value Date/Time    PT 15.1* 05/18/2017 08:20 AM     INR 1.15* 05/18/2017 08:20 AM        Imaging/ Testing:      CT-KNEE WITH PLUS RECONS LEFT   Final Result      1.  Fluid collection in the medial popliteal region is identified which likely represents popliteal cyst.      2.  No solid mass appreciated.      3.  Small joint effusion is identified.      4.  No acute fracture or focal bone abnormalities are identified.      CT-EXTREMITY, LOWER WITH LEFT   Final Result      1.  Soft tissue edema and swelling in the left ankle region could be due to cellulitis.      2.  No soft tissue abscess is appreciated.      3.  No bone erosion or periosteal reaction is noted.      LE VENOUS DUPLEX (Specify in Comments Left, Right Or Bilateral)   Final Result      DX-ANKLE 3+ VIEWS LEFT   Final Result      Soft tissue swelling. No acute fracture.                MEDICATIONS:     Medication List      START taking these medications       Instructions    amoxicillin-clavulanate 875-125 MG Tabs   Commonly known as:  AUGMENTIN    Take 1 Tab by mouth 2 times a day for 8 days.   Dose:  1 Tab       doxycycline 100 MG Tabs   Commonly known as:  VIBRAMYCIN    Take 1 Tab by mouth 2 times a day for 8 days.   Dose:  100 mg       metoprolol 25 MG Tabs   Last time this was given:  25 mg on 5/21/2017  8:21 AM   Commonly known as:  LOPRESSOR    Take 1 Tab by mouth 2 Times a Day.   Dose:  25 mg         CONTINUE taking these medications       Instructions    ADDERALL (5MG) 5 MG Tabs   Last time this was given:  5 mg on 5/21/2017  4:50 AM   Generic drug:  amphetamine-dextroamphetamine    Take 5 mg by mouth 2 times a day.   Dose:  5 mg       cyclobenzaprine 10 MG Tabs   Last time this was given:  10 mg on 5/21/2017  4:50 AM   Commonly known as:  FLEXERIL    Take 10 mg by mouth 3 times a day as needed for Moderate Pain or Muscle Spasms.   Dose:  10 mg       fluticasone 50 MCG/ACT nasal spray   Commonly known as:  FLONASE    Spray 1 Spray in nose every day.   Dose:  1 Spray       losartan 100 MG Tabs    Last time this was given:  100 mg on 5/21/2017  8:20 AM   Commonly known as:  COZAAR    Take 1 Tab by mouth every day.   Dose:  100 mg       meloxicam 15 MG tablet   Last time this was given:  15 mg on 5/18/2017  4:55 PM   Commonly known as:  MOBIC    Take 15 mg by mouth every day.   Dose:  15 mg       oxycodone-acetaminophen 7.5-325 MG per tablet   Last time this was given:  1 Tab on 5/21/2017  1:56 AM   Commonly known as:  PERCOCET    Take 1 Tab by mouth every 8 hours as needed.   Dose:  1 Tab       tramadol 50 MG Tabs   Last time this was given:  50 mg on 5/21/2017 12:33 PM   Commonly known as:  ULTRAM    Take 50 mg by mouth every 6 hours as needed.   Dose:  50 mg       vitamin D (Ergocalciferol) 68354 UNITS Caps capsule   Commonly known as:  DRISDOL    Take 50,000 Units by mouth every 7 days.   Dose:  65613 Units         STOP taking these medications          predniSONE 10 MG Tabs   Commonly known as:  [facility] COURSE AND DECISION MAKING:  Please see dictated by Dr. Bayron Alicia on   May 18th.  This is a 36-year-old male with history of hypertension, chronic   back pain from ruptured cyst, right knee pain and swelling, which he   attributes to the bad back compensating gait and ADHD that presented with left   ankle swelling, pain and redness, found to have cellulitis and placed on   Unasyn and vancomycin.  Blood cultures were negative.  No other open wounds   available for cultures.  The swelling and redness did significantly decrease   after a few days of IV antibiotics.  He was given IV fluids, serial lactic   acid levels were followed as on admission, he met simple sepsis criteria with   elevated white count, elevated lactic acid, tachycardia and increased   respiratory rate.  This resolved shortly after IV fluids and IV antibiotics.    Leukocytosis could also be attributed to steroids, which he was on for his   chronic back pain and he otherwise was very stable.  His leukocytosis  started   to resolve.  His pain, swelling and redness again had significantly decreased.    He is ambulatory and able to be switched to oral doxycycline and Augmentin.    On ultrasound of his left lower extremity, a popliteal mass was noted.  A   followup CT showed a benign cyst.  Patient verbalized understanding of all of   his discharge instructions, had no further questions or concerns at this time.    He will follow up with a new primary care provider and he is stable to   discharge to home on May 21.  Resume a heart healthy diet and activity as   tolerated.    Instructions:      The patient was instructed to return to the ER in the event of worsening symptoms. I have counseled the patient on the importance of compliance and the patient has agreed to proceed with all medical recommendations and follow up plan indicated above.   The patient understands that all medications come with benefits and risks. Risks may include permanent injury or death and these risks can be minimized with close reassessment and monitoring.        Opioid prescription history checked: no    Time spent in coordination of discharge was 35 minutes. This included face to face with the patient, medication reconciliation, care co ordination with RN involved in patient care and discussion and co ordination with case management.          ____________________________________     VAMSHI Martínez    DD:  05/21/2017 12:07:23  DT:  05/21/2017 23:40:04    D#:  [id number]  Job#:  [id number]

## 2017-05-23 LAB
BACTERIA BLD CULT: NORMAL
BACTERIA BLD CULT: NORMAL
SIGNIFICANT IND 70042: NORMAL
SIGNIFICANT IND 70042: NORMAL
SITE SITE: NORMAL
SITE SITE: NORMAL
SOURCE SOURCE: NORMAL
SOURCE SOURCE: NORMAL

## 2017-06-01 ENCOUNTER — OFFICE VISIT (OUTPATIENT)
Dept: MEDICAL GROUP | Facility: PHYSICIAN GROUP | Age: 37
End: 2017-06-01
Payer: COMMERCIAL

## 2017-06-01 VITALS
HEIGHT: 78 IN | WEIGHT: 280.43 LBS | TEMPERATURE: 96.7 F | OXYGEN SATURATION: 95 % | SYSTOLIC BLOOD PRESSURE: 120 MMHG | DIASTOLIC BLOOD PRESSURE: 80 MMHG | BODY MASS INDEX: 32.45 KG/M2 | HEART RATE: 127 BPM

## 2017-06-01 DIAGNOSIS — F90.0 ATTENTION DEFICIT HYPERACTIVITY DISORDER (ADHD), PREDOMINANTLY INATTENTIVE TYPE: ICD-10-CM

## 2017-06-01 DIAGNOSIS — M54.41 CHRONIC BILATERAL LOW BACK PAIN WITH BILATERAL SCIATICA: ICD-10-CM

## 2017-06-01 DIAGNOSIS — L03.116 LEFT LEG CELLULITIS: Primary | ICD-10-CM

## 2017-06-01 DIAGNOSIS — G89.29 CHRONIC BILATERAL LOW BACK PAIN WITH BILATERAL SCIATICA: ICD-10-CM

## 2017-06-01 DIAGNOSIS — E66.9 OBESITY (BMI 30-39.9): ICD-10-CM

## 2017-06-01 DIAGNOSIS — I10 ESSENTIAL HYPERTENSION: Chronic | ICD-10-CM

## 2017-06-01 DIAGNOSIS — M54.42 CHRONIC BILATERAL LOW BACK PAIN WITH BILATERAL SCIATICA: ICD-10-CM

## 2017-06-01 PROBLEM — M54.40 CHRONIC BILATERAL LOW BACK PAIN WITH SCIATICA: Status: ACTIVE | Noted: 2017-05-21

## 2017-06-01 PROCEDURE — 99204 OFFICE O/P NEW MOD 45 MIN: CPT | Performed by: PHYSICIAN ASSISTANT

## 2017-06-01 RX ORDER — HYDROCODONE BITARTRATE AND ACETAMINOPHEN 5; 325 MG/1; MG/1
1-2 TABLET ORAL EVERY 4 HOURS PRN
COMMUNITY
End: 2019-03-31

## 2017-06-01 RX ORDER — SULFAMETHOXAZOLE AND TRIMETHOPRIM 400; 80 MG/1; MG/1
1 TABLET ORAL 2 TIMES DAILY
Qty: 20 TAB | Refills: 0 | Status: SHIPPED | OUTPATIENT
Start: 2017-06-01 | End: 2019-03-31

## 2017-06-01 ASSESSMENT — PATIENT HEALTH QUESTIONNAIRE - PHQ9: CLINICAL INTERPRETATION OF PHQ2 SCORE: 0

## 2017-06-01 NOTE — ASSESSMENT & PLAN NOTE
[facility] he was exercising up until Dec 2016. [facility] he was exercising 3 times a week with weights and doing cardio. [facility] he would do 30 minutes of cardio and 30 minutes weights. [facility] he started working a lot more, developed low back pain with bilateral sciatica, and cellulitis of left lower leg and hasn't been able to exercise the way he once did. [facility] he has plans on exercising again once his back pain subsides and trying to be more conscience of making healthier food choices.

## 2017-06-01 NOTE — MR AVS SNAPSHOT
"Anmol Herring   2017 8:20 AM   Office Visit   MRN: [id number]    Department:  [facility]   Dept Phone:  [phone]    Description:  Male : 1980   Provider:  Nathalie Pfeiffer PA-C           Reason for Visit     Hospital Follow-up cellulitis      Allergies as of 2017     No Known Allergies      You were diagnosed with     Essential hypertension   [4021873]       Chronic bilateral low back pain with bilateral sciatica   [5031974]       Attention deficit hyperactivity disorder (ADHD), unspecified ADHD type   [1766806]       Obesity (BMI 30-39.9)   [736272]       Left leg cellulitis   [914546]         Vital Signs     Blood Pressure Pulse Temperature Height Weight Body Mass Index    120/80 mmHg 127 35.9 °C (96.7 °F) 2.007 m (6' 7\") 127.2 kg (280 lb 6.8 oz) 31.58 kg/m2    Oxygen Saturation Smoking Status                95% Never Smoker           Basic Information     Date Of Birth Sex Race Ethnicity Preferred Language    1980 Male White Non- English      Your appointments     2017 10:00 AM   PT New Evaluation 60 Minutes with [facility] ([address])    [address] [place] [address]   [phone]           Please bring Photo ID, Insurance Cards, list of all Medication and copies of any legal documents (such as Living Will, Power of ) If speaking a language besides English please bring an adult . Please arrive 30 minutes prior for check in and registration.            2017 12:00 PM   PT Follow Up 30 Minutes with [facility] ([address])    [address] [place] [address]   [phone]            2017 11:30 AM   PT Follow Up 30 Minutes with [facility] ([address])    [address] [place] [address]   [phone]            2017 12:00 PM   "   PT Follow Up 30 Minutes with Karen Servin P.T.   [facility] [address]    [address]  [address]  [place] [address]   [phone]            Jun 22, 2017 12:00 PM   PT Follow Up 30 Minutes with Karen Servin P.T.   [facility] [address]    [address]  [address]  [place] [address]   [phone]            Jun 27, 2017 12:00 PM   PT Follow Up 30 Minutes with Karen Servin P.T.   [facility] [address]    [address]  [address]  [place] [address]   [phone]            Jun 29, 2017 12:00 PM   PT Follow Up 30 Minutes with Karen Servin P.T.   [facility] [address]    [address]  [address]  [place] [address]   [phone]            Jul 03, 2017 12:00 PM   PT Follow Up 30 Minutes with Karen Servin P.T.   [facility] [address]    [address]  [address]  [place] [address]   [phone]              Problem List              ICD-10-CM Priority Class Noted - Resolved    Left leg cellulitis L03.116 High  5/18/2017 - Present    HTN (hypertension) (Chronic) I10   5/21/2017 - Present    Chronic bilateral low back pain with sciatica M54.40, G89.29   5/21/2017 - Present    ADHD (attention deficit hyperactivity disorder) (Chronic) F90.9   5/21/2017 - Present    Obesity (BMI 30-39.9) E66.9   6/1/2017 - Present      Health Maintenance        Date Due Completion Dates    IMM DTaP/Tdap/Td Vaccine (2 - Td) 5/21/2027 5/21/2017            Current Immunizations     Tdap Vaccine 5/21/2017 12:29 PM      Below and/or attached are the medications your provider expects you to take. Review all of your home medications and newly ordered medications with your provider and/or pharmacist. Follow medication instructions as directed by your provider and/or pharmacist. Please keep your medication list with you and share with your provider.  Update the information when medications are discontinued, doses are changed, or new medications (including over-the-counter products) are added; and carry medication information at all times in the event of emergency situations     Allergies:  No Known Allergies          Medications  Valid as of: June 01, 2017 -  9:27 AM    Generic Name Brand Name Tablet Size Instructions for use    Amphetamine-Dextroamphetamine (Tab) ADDERALL 5 MG Take 5 mg by mouth 2 times a day.        Cyclobenzaprine HCl (Tab) FLEXERIL 10 MG Take 10 mg by mouth 3 times a day as needed for Moderate Pain or Muscle Spasms.        Ergocalciferol (Cap) DRISDOL 35430 UNITS Take 50,000 Units by mouth every 7 days.        Fluticasone Propionate (Suspension) FLONASE 50 MCG/ACT Spray 1 Spray in nose every day.        Hydrocodone-Acetaminophen (Tab) NORCO 5-325 MG Take 1-2 Tabs by mouth every four hours as needed.        Losartan Potassium (Tab) COZAAR 100 MG Take 1 Tab by mouth every day.        Meloxicam (Tab) MOBIC 15 MG Take 15 mg by mouth every day.        Metoprolol Tartrate (Tab) LOPRESSOR 25 MG Take 1 Tab by mouth 2 Times a Day.        Oxycodone-Acetaminophen (Tab) PERCOCET 7.5-325 MG Take 1 Tab by mouth every 8 hours as needed.        Sulfamethoxazole-Trimethoprim (Tab) BACTRIM 400-80 MG Take 1 Tab by mouth 2 times a day.        TraMADol HCl (Tab) ULTRAM 50 MG Take 50 mg by mouth every 6 hours as needed.        .                 Medicines prescribed today were sent to:     [facility] [address] [place] [address] [address] [address] [address] [facility] [address]    Phone: [phone] Fax: [phone]    Open 24 Hours?: Yes      Medication refill instructions:       If your prescription bottle indicates you have medication refills left, it is not necessary to call your provider’s office. Please contact your pharmacy and they will refill your medication.    If your prescription bottle indicates you do not have any  refills left, you may request refills at any time through one of the following ways: The online [employer] system (except Urgent Care), by calling your provider’s office, or by asking your pharmacy to contact your provider’s office with a refill request. Medication refills are processed only during regular business hours and may not be available until the next business day. Your provider may request additional information or to have a follow-up visit with you prior to refilling your medication.   *Please Note: Medication refills are assigned a new Rx number when refilled electronically. Your pharmacy may indicate that no refills were authorized even though a new prescription for the same medication is available at the pharmacy. Please request the medicine by name with the pharmacy before contacting your provider for a refill.        Your To Do List     Future Labs/Procedures Complete By Expires    LIPID PROFILE  As directed 6/1/2018         [employer] Access Code: Activation code not generated  Current [employer] Status: Active

## 2017-06-01 NOTE — PROGRESS NOTES
"Chief Complaint   Patient presents with   • Hospital Follow-up     cellulitis       HISTORY OF PRESENT ILLNESS: Anmol Herring is an established 36 y.o. male here to discuss the evaluation and management of:    Chronic bilateral low back pain with sciatica  Pt. States he has been suffering from lower back pain with bilateral sciatica for 4-5 weeks. States when reaching for items he will feel a stabbing pain in his left hip. States he gets dull pains with movement. States the pain varies from sharp to dull. States his chiropractor adjusted him ~5 weeks ago for a routine adjustment and when he went home he started experiencing left hip pain. States he returned to the chiropractor one week later and had another adjustment and lost 5-10% of mobility. States he has been going to his chiropractor for once a month for two yeats. States he did fall ~ 1 year ago and never had it looked into until recently when he developed lower back and bilateral sciatica problems. States after his fall he did experience left hip pain and saw an orthopedic who referred him to physical therapy. States he went to physical therapy for 1 day and hip pain resolved. States an MRI was performed in [place] and revealed an \"L3-L4 tear.\"  States he has a back doctor who works in an orthopedics office in [place] who has referred him to a physical therapy and [facility] will be setting up the appointments.   Admits to numbness of bilateral knees, tingling of left foot where cellulitis was. Denies myalgias and incontinence.    Essential hypertension  Chronic and Stable. Patient was diagnosed with hypertension ~2 years ago in [place] by his primary care provider. States he was prescribed losartan 100 mg daily ~2 years ago and was placed on metoprolol 25 mg bid by [facility] two weeks ago. States he is compliant and is not experiencing no side effects from the medications. Denies chest pain, shortness of breath, jaw claudication, upper extremity " pain, tremors, headaches, syncope.  Admits to edema and left lower leg due to recent cellulitis of lower left leg.     Attention deficit hyperactivity disorder (ADHD), predominantly inattentive type  Chronic and Stable. [facility] he was diagnosed in 4th grade. [facility] he was placed on medication as a child and stopped his prescribed medication after high school. [facility] he started medication again a year and half ago and was prescribed adderall 5 mg bid.  [facility] he was experiencing lack of focus, the inability to complete task, and was easily distracted. [facility] he is compliant with prescribed medication and is not experiencing side effects or complications from medications.      Left leg cellulitis  Pt. States he was diagnosed with cellulitis on 5/18/2017. [facility] he woke up on the 18 th with a red, swollen left foot. [facility] his  told him to go to the emergency room. [facility] called for an ambulance. [facility] he was transported to [facility] via ambulance and was immediately place on antibiotics and admitted.[facility] he doesn't know what caused the cellulitis. States it may have been related to traveling from [place] to [place] and lack of mobility due to lower back pain with bilateral sciatica.   [facility] he feels much better overall, but is still experiencing swelling of lower left ankle. [facility] he has completed antibiotics and was compliant with his medication.   Denies discharge, pruritus, no redness, trauma, fever, chills, vomiting, diarrhea, abdominal pain.      Obesity (BMI 30-39.9)  States he was exercising up until Dec 2016. [facility] he was exercising 3 times a week with weights and doing cardio. [facility] he would do 30 minutes of cardio and 30 minutes weights. [facility] he started working a lot more, developed low back pain with bilateral sciatica, and cellulitis of left lower leg and hasn't been able to exercise the way he once did. [facility] he has plans on exercising again once his back pain subsides and  trying to be more conscience of making healthier food choices.       Patient Active Problem List    Diagnosis Date Noted   • Left leg cellulitis 05/18/2017     Priority: High   • Obesity (BMI 30-39.9) 06/01/2017   • Essential hypertension 05/21/2017   • Chronic bilateral low back pain with sciatica 05/21/2017   • Attention deficit hyperactivity disorder (ADHD), predominantly inattentive type 05/21/2017       Allergies:Review of patient's allergies indicates no known allergies.    Current Outpatient Prescriptions   Medication Sig Dispense Refill   • hydrocodone-acetaminophen (NORCO) 5-325 MG Tab per tablet Take 1-2 Tabs by mouth every four hours as needed.     • sulfamethoxazole-trimethoprim (BACTRIM) 400-80 MG Tab Take 1 Tab by mouth 2 times a day. 20 Tab 0   • metoprolol (LOPRESSOR) 25 MG Tab Take 1 Tab by mouth 2 Times a Day. 60 Tab 1   • losartan (COZAAR) 100 MG Tab Take 1 Tab by mouth every day. 30 Tab 1   • cyclobenzaprine (FLEXERIL) 10 MG Tab Take 10 mg by mouth 3 times a day as needed for Moderate Pain or Muscle Spasms.     • tramadol (ULTRAM) 50 MG Tab Take 50 mg by mouth every 6 hours as needed.     • amphetamine-dextroamphetamine (ADDERALL, 5MG,) 5 MG Tab Take 5 mg by mouth 2 times a day.     • vitamin D, Ergocalciferol, (DRISDOL) 74843 UNITS Cap capsule Take 50,000 Units by mouth every 7 days.     • oxycodone-acetaminophen (PERCOCET) 7.5-325 MG per tablet Take 1 Tab by mouth every 8 hours as needed.     • fluticasone (FLONASE) 50 MCG/ACT nasal spray Spray 1 Spray in nose every day.     • meloxicam (MOBIC) 15 MG tablet Take 15 mg by mouth every day.       No current facility-administered medications for this visit.       Social History   Substance Use Topics   • Smoking status: Never Smoker    • Smokeless tobacco: Never Used   • Alcohol Use: No       No family status information on file.     Family History   Problem Relation Age of Onset   • No Known Problems Mother    • Hypertension Father    • No Known  "Problems Brother    • Hypertension Maternal Grandmother    • Genetic Maternal Grandfather    • Cancer Paternal Grandmother      melanoma   • No Known Problems Paternal Grandfather        ROS:  Review of Systems   Constitutional: Negative for fever, chills, weight loss and malaise/fatigue.   HENT: Negative for ear pain, nosebleeds, congestion, sore throat and neck pain.    Eyes: Negative for blurred vision.   Respiratory: Negative for cough, sputum production, shortness of breath and wheezing.    Cardiovascular: Negative for chest pain, palpitations, orthopnea and Positive left lower leg swelling.   Gastrointestinal: Negative for heartburn, nausea, vomiting and abdominal pain.   Genitourinary: Negative for dysuria, urgency and frequency.   Musculoskeletal: Negative for myalgias, Positive back pain and bilateral sciatica. Positive left knee and hip joint pain.   Skin: Negative for rash and itching.   Neurological: Negative for dizziness, Positive tingling of left foot Negative for tremors, sensory change, focal weakness and headaches.   Endo/Heme/Allergies: Does not bruise/bleed easily.   Psychiatric/Behavioral: Negative for depression, suicidal ideas and memory loss.  The patient is not nervous/anxious and does not have insomnia.    All other systems reviewed and are negative except as in HPI.    Exam: Blood pressure 120/80, pulse 127, temperature 35.9 °C (96.7 °F), height 2.007 m (6' 7\"), weight 127.2 kg (280 lb 6.8 oz), SpO2 95 %. Body mass index is 31.58 kg/(m^2).  General: Normal appearing. No distress.  HEENT: Normocephalic. Eyes conjunctiva clear lids without ptosis, pupils equal and reactive to light accommodation, ears normal shape and contour, canals are clear bilaterally, tympanic membranes are benign, nasal mucosa benign, oropharynx is without erythema, edema or exudates.   Neck: Supple without JVD or bruit. Thyroid is not enlarged.  Pulmonary: Clear to ausculation.  Normal effort. No rales, ronchi, or " wheezing.  Cardiovascular: Regular rate and rhythm without murmur. Carotid and radial pulses are intact and equal bilaterally.  Abdomen: Soft, nontender, nondistended. Normal bowel sounds. Liver and spleen are not palpable  Neurologic: Grossly nonfocal.  Cranial nerves are normal. DTR's normal and symmetric.  Lymph: No cervical, supraclavicular or axillary lymph nodes are palpable  Skin: Warm and dry.  No rashes or suspicious skin lesions.  Musculoskeletal: Abnormal gait, small steps and using cane to assist with walking. No extremity cyanosis, clubbing. Positive for edema of the lower left leg. Positive for mild redness and warmth located over the medial malleolus region with swelling and blanching when touched.   Psych: Normal mood and affect. Alert and oriented x3. Judgment and insight is normal.    Medical decision-making and discussion: The patient is a 36-year-old male who is here today to discuss hospital follow-up for cellulitis and establish care. Patient states he's doing much better since recent discharge from hospital for cellulitis. States left ankle is still swelling that he has been elevating leg at night. When examining the patient's left lower extremity there is nonpitting edema, mild redness of the medial malleolus area, and is warm to touch. Patient has been prescribed Bactrim 400-80 milligram tablet twice a day for 10 days. Advised patient to continue elevating left lower extremity, avoid trauma to the area, and to be compliant with prescribed antibiotic medication Advised patient is symptoms do not improve or get worse to seek medical attention. Patient suffers from lower back pain with bilateral sciatica and has physical therapy appointments in the near future. Past medical records were unable to be viewed at this time, to view recent MRI performed in [place]. Patient is using a cane today and is having difficulty with getting up from sitting position and unable to take full steps when  walking. Patient is obese and states he plans on working on diet and exercise wants back pain subsides. States he has a history of ADHD and currently takes Adderall 5 mg twice a day. Patient states he was diagnosed with hypertension~2 years ago and currently takes metoprolol 5 mg twice a day and losartan 100 mg once daily.    Please note that this dictation was created using voice recognition software. I have made every reasonable attempt to correct obvious errors, but I expect that there are errors of grammar and possibly content that I did not discover before finalizing the note.    Assessment/Plan:  1. Essential hypertension  LIPID PROFILE   2. Chronic bilateral low back pain with bilateral sciatica     3. Attention deficit hyperactivity disorder (ADHD), predominantly inattentive type     4. Obesity (BMI 30-39.9)  Patient identified as having weight management issue.  Appropriate orders and counseling given.    LIPID PROFILE   5. Left leg cellulitis  sulfamethoxazole-trimethoprim (BACTRIM) 400-80 MG Tab       Return in about 3 weeks (around 6/22/2017).

## 2017-06-01 NOTE — ASSESSMENT & PLAN NOTE
Chronic and Stable. Patient was diagnosed with hypertension ~2 years ago in [place] by his primary care provider. States he was prescribed losartan 100 mg daily ~2 years ago and was placed on metoprolol 25 mg bid by [facility] two weeks ago. States he is compliant and is not experiencing no side effects from the medications. Denies chest pain, shortness of breath, jaw claudication, upper extremity pain, tremors, headaches, syncope.  Admits to edema and left lower leg due to recent cellulitis of lower left leg.

## 2017-06-01 NOTE — ASSESSMENT & PLAN NOTE
"Pt. States he has been suffering from lower back pain with bilateral sciatica for 4-5 weeks. States when reaching for items he will feel a stabbing pain in his left hip. States he gets dull pains with movement. States the pain varies from sharp to dull. States his chiropractor adjusted him ~5 weeks ago for a routine adjustment and when he went home he started experiencing left hip pain. States he returned to the chiropractor one week later and had another adjustment and lost 5-10% of mobility. States he has been going to his chiropractor for once a month for two yeats. States he did fall ~ 1 year ago and never had it looked into until recently when he developed lower back and bilateral sciatica problems. States after his fall he did experience left hip pain and saw an orthopedic who referred him to physical therapy. States he went to physical therapy for 1 day and hip pain resolved. States an MRI was performed in [place] and revealed an \"L3-L4 tear.\"  States he has a back doctor who works in an orthopedics office in [place] who has referred him to a physical therapy and [facility] will be setting up the appointments.   Admits to numbness of bilateral knees, tingling of left foot where cellulitis was. Denies myalgias and incontinence.  "

## 2017-06-01 NOTE — ASSESSMENT & PLAN NOTE
Pt.  he was diagnosed with cellulitis on 5/18/2017.  he woke up on the 18 th with a red, swollen left foot.  his  told him to go to the emergency room.  [facility] called for an ambulance.  he was transported to [facility] via ambulance and was immediately place on antibiotics and admitted. he doesn't know what caused the cellulitis.  it may have been related to traveling from [place] to [place] and lack of mobility due to lower back pain with bilateral sciatica.    he feels much better overall, but is still experiencing swelling of lower left ankle.  he has completed antibiotics and was compliant with his medication.   Denies discharge, pruritus, no redness, trauma, fever, chills, vomiting, diarrhea, abdominal pain.

## 2017-06-01 NOTE — ASSESSMENT & PLAN NOTE
Chronic and Stable. States he was diagnosed in 4th grade. States he was placed on medication as a child and stopped his prescribed medication after high school. States he started medication again a year and half ago and was prescribed adderall 5 mg bid.  States he was experiencing lack of focus, the inability to complete task, and was easily distracted. States he is compliant with prescribed medication and is not experiencing side effects or complications from medications.

## 2017-06-07 ENCOUNTER — HOSPITAL ENCOUNTER (OUTPATIENT)
Dept: PHYSICAL THERAPY | Facility: REHABILITATION | Age: 37
End: 2017-06-07
Attending: ORTHOPAEDIC SURGERY
Payer: COMMERCIAL

## 2017-06-07 PROCEDURE — 97012 MECHANICAL TRACTION THERAPY: CPT

## 2017-06-07 PROCEDURE — 97161 PT EVAL LOW COMPLEX 20 MIN: CPT

## 2017-06-12 ENCOUNTER — HOSPITAL ENCOUNTER (OUTPATIENT)
Dept: PHYSICAL THERAPY | Facility: REHABILITATION | Age: 37
End: 2017-06-12
Attending: ORTHOPAEDIC SURGERY
Payer: COMMERCIAL

## 2017-06-12 PROCEDURE — 97110 THERAPEUTIC EXERCISES: CPT

## 2017-06-12 PROCEDURE — 97012 MECHANICAL TRACTION THERAPY: CPT

## 2017-06-12 NOTE — TELEPHONE ENCOUNTER
Was the patient seen in the last year in this department? Yes   Last appt 6/1/17     Does patient have an active prescription for medications requested? No  Have not filled for patient.     Received Request Via: Patient

## 2017-06-12 NOTE — TELEPHONE ENCOUNTER
----- Message from Your Healthcare Team sent at 6/11/2017  4:47 PM PDT -----  Regarding: Non-Urgent Medical Question  Contact: [phone]  My foot is still swollen and warm. And I am finishing the antibiotics today. Let me know if you would like me to come in.

## 2017-06-14 ENCOUNTER — HOSPITAL ENCOUNTER (OUTPATIENT)
Dept: PHYSICAL THERAPY | Facility: REHABILITATION | Age: 37
End: 2017-06-14
Attending: ORTHOPAEDIC SURGERY
Payer: COMMERCIAL

## 2017-06-14 PROCEDURE — 97012 MECHANICAL TRACTION THERAPY: CPT

## 2017-06-14 PROCEDURE — 97110 THERAPEUTIC EXERCISES: CPT

## 2017-06-14 RX ORDER — CYCLOBENZAPRINE HCL 10 MG
10 TABLET ORAL 3 TIMES DAILY PRN
Qty: 30 TAB | Refills: 0 | Status: SHIPPED | OUTPATIENT
Start: 2017-06-14 | End: 2017-06-29 | Stop reason: SDUPTHER

## 2017-06-15 ENCOUNTER — OFFICE VISIT (OUTPATIENT)
Dept: MEDICAL GROUP | Facility: PHYSICIAN GROUP | Age: 37
End: 2017-06-15
Payer: COMMERCIAL

## 2017-06-15 VITALS
BODY MASS INDEX: 30.66 KG/M2 | DIASTOLIC BLOOD PRESSURE: 68 MMHG | OXYGEN SATURATION: 96 % | HEART RATE: 120 BPM | SYSTOLIC BLOOD PRESSURE: 120 MMHG | TEMPERATURE: 98.6 F | WEIGHT: 265 LBS | RESPIRATION RATE: 16 BRPM | HEIGHT: 78 IN

## 2017-06-15 DIAGNOSIS — M54.41 CHRONIC BILATERAL LOW BACK PAIN WITH BILATERAL SCIATICA: ICD-10-CM

## 2017-06-15 DIAGNOSIS — M54.42 CHRONIC BILATERAL LOW BACK PAIN WITH BILATERAL SCIATICA: ICD-10-CM

## 2017-06-15 DIAGNOSIS — G89.29 CHRONIC BILATERAL LOW BACK PAIN WITH BILATERAL SCIATICA: ICD-10-CM

## 2017-06-15 DIAGNOSIS — R60.0 LOCALIZED EDEMA: ICD-10-CM

## 2017-06-15 DIAGNOSIS — L03.116 LEFT LEG CELLULITIS: ICD-10-CM

## 2017-06-15 PROCEDURE — 99214 OFFICE O/P EST MOD 30 MIN: CPT | Performed by: PHYSICIAN ASSISTANT

## 2017-06-15 RX ORDER — MELOXICAM 15 MG/1
15 TABLET ORAL DAILY
Qty: 30 TAB | Refills: 1 | Status: SHIPPED | OUTPATIENT
Start: 2017-06-15 | End: 2017-08-28 | Stop reason: SDUPTHER

## 2017-06-15 NOTE — ASSESSMENT & PLAN NOTE
Patient states he has been suffering from lower back pain with bilateral sciatica for 7-8 weeks. States he gets dull pain with movement. The pain varies from sharp to dull. States an MRI was performed in [place] and revealed an L3-L4 tear. States he has a back doctor who works in orthopedics office in [place] who has referred him to physical therapy [employer] has set this appointment up for him. States he has attended physical therapy and is feeling some improvement in back and left knee. States he is still in chronic pain. States he would like to have a pain medication refill. Discussed with patient the importance of not depending on pain medication. Advised patient to take an anti-inflammatory to help the inflammation in her lower back left ankle and knees. Patient denies taking any anti-inflammatories to help with inflammation. Patient denies myalgias, incontinence, chest pain, shortness of breath, palpitations, fever, chills, nausea, vomiting, tremor, diarrhea, abdominal pain, constipation.

## 2017-06-15 NOTE — MR AVS SNAPSHOT
"Anmol Herring   6/15/2017 1:20 PM   Office Visit   MRN: [id number]    Department:  [facility]   Dept Phone:  [phone]    Description:  Male : 1980   Provider:  Nathalie Pfeiffer PA-C           Allergies as of 6/15/2017     No Known Allergies      You were diagnosed with     Chronic bilateral low back pain with bilateral sciatica   [3714130]       Localized edema   [394133]         Vital Signs     Blood Pressure Pulse Temperature Respirations Height Weight    120/68 mmHg 120 37 °C (98.6 °F) 16 2.007 m (6' 7\") 120.203 kg (265 lb)    Body Mass Index Oxygen Saturation Smoking Status             29.84 kg/m2 96% Never Smoker          Basic Information     Date Of Birth Sex Race Ethnicity Preferred Language    1980 Male White Non- English      Your appointments     2017 12:00 PM   PT Follow Up 30 Minutes with [facility] [address]    [address]  [address]  [place] [address]   [phone]            2017 12:00 PM   PT Follow Up 30 Minutes with [facility] [address]    [address]  [address]  [place] [address]   [phone]            2017 12:00 PM   PT Follow Up 30 Minutes with [facility] [address]    [address]  [address]  [place] [address]   [phone]            2017 12:00 PM   PT Follow Up 30 Minutes with [facility] [address]    [address]  [address]  [place] [address]   [phone]            2017  2:40 PM   [facility]    [address]  [address]  [place] [id number] [phone]           You will be receiving a confirmation call a few days before your appointment from our automated call confirmation system.            2017 " 12:00 PM   PT Follow Up 30 Minutes with Karen Servin P.T.   [facility] [address]    [address]  [address]  [place] [address]   [phone]              Problem List              ICD-10-CM Priority Class Noted - Resolved    Left leg cellulitis L03.116 High  5/18/2017 - Present    Essential hypertension I10   5/21/2017 - Present    Chronic bilateral low back pain with sciatica M54.40, G89.29   5/21/2017 - Present    Attention deficit hyperactivity disorder (ADHD), predominantly inattentive type F90.0   5/21/2017 - Present    Obesity (BMI 30-39.9) E66.9   6/1/2017 - Present      Health Maintenance        Date Due Completion Dates    IMM DTaP/Tdap/Td Vaccine (2 - Td) 5/21/2027 5/21/2017            Current Immunizations     Tdap Vaccine 5/21/2017 12:29 PM      Below and/or attached are the medications your provider expects you to take. Review all of your home medications and newly ordered medications with your provider and/or pharmacist. Follow medication instructions as directed by your provider and/or pharmacist. Please keep your medication list with you and share with your provider. Update the information when medications are discontinued, doses are changed, or new medications (including over-the-counter products) are added; and carry medication information at all times in the event of emergency situations     Allergies:  No Known Allergies          Medications  Valid as of: Robyn 15, 2017 -  2:55 PM    Generic Name Brand Name Tablet Size Instructions for use    Amphetamine-Dextroamphetamine (Tab) ADDERALL 5 MG Take 5 mg by mouth 2 times a day.        Cyclobenzaprine HCl (Tab) FLEXERIL 10 MG Take 1 Tab by mouth 3 times a day as needed for Moderate Pain or Muscle Spasms.        Ergocalciferol (Cap) DRISDOL 17255 UNITS Take 50,000 Units by mouth every 7 days.        Fluticasone Propionate (Suspension) FLONASE 50 MCG/ACT Spray 1 Spray in nose every day.         Hydrocodone-Acetaminophen (Tab) NORCO 5-325 MG Take 1-2 Tabs by mouth every four hours as needed.        Losartan Potassium (Tab) COZAAR 100 MG Take 1 Tab by mouth every day.        Meloxicam (Tab) MOBIC 15 MG Take 1 Tab by mouth every day.        Metoprolol Tartrate (Tab) LOPRESSOR 25 MG Take 1 Tab by mouth 2 Times a Day.        Oxycodone-Acetaminophen (Tab) PERCOCET 7.5-325 MG Take 1 Tab by mouth every 8 hours as needed.        Sulfamethoxazole-Trimethoprim (Tab) BACTRIM 400-80 MG Take 1 Tab by mouth 2 times a day.        TraMADol HCl (Tab) ULTRAM 50 MG Take 50 mg by mouth every 6 hours as needed.        .                 Medicines prescribed today were sent to:     [id number] - [place], [place] - [address]    [address] [address]    Phone: [phone] Fax: [phone]    Open 24 Hours?: Yes    [id number] - [place], [place] - [address]    [address] [facility] [place] [address]    Phone: [phone] Fax: [phone]    Open 24 Hours?: No      Medication refill instructions:       If your prescription bottle indicates you have medication refills left, it is not necessary to call your provider’s office. Please contact your pharmacy and they will refill your medication.    If your prescription bottle indicates you do not have any refills left, you may request refills at any time through one of the following ways: The online [employer] system (except Urgent Care), by calling your provider’s office, or by asking your pharmacy to contact your provider’s office with a refill request. Medication refills are processed only during regular business hours and may not be available until the next business day. Your provider may request additional information or to have a follow-up visit with you prior to refilling your medication.   *Please Note: Medication refills are assigned a new Rx number when refilled  electronically. Your pharmacy may indicate that no refills were authorized even though a new prescription for the same medication is available at the pharmacy. Please request the medicine by name with the pharmacy before contacting your provider for a refill.        Referral     A referral request has been sent to our patient care coordination department. Please allow 3-5 business days for us to process this request and contact you either by phone or mail. If you do not hear from us by the 5th business day, please call us at [phone].           en[employer]volv Access Code: Activation code not generated  Current enEvolv Status: Active

## 2017-06-16 NOTE — PROGRESS NOTES
No chief complaint on file.      HISTORY OF PRESENT ILLNESS: Anmol Herring is an established 36 y.o. male here to discuss the evaluation and management of:    Chronic bilateral low back pain with sciatica  Patient states he has been suffering from lower back pain with bilateral sciatica for 7-8 weeks. States he gets dull pain with movement. The pain varies from sharp to dull. States an MRI was performed in [place] and revealed an L3-L4 tear. States he has a back doctor who works in orthopedics office in [place] who has referred him to physical therapy [facility] has set this appointment up for him. States he has attended physical therapy and is feeling some improvement in back and left knee. States he is still in chronic pain. States he would like to have a pain medication refill. Discussed with patient the importance of not depending on pain medication. Advised patient to take an anti-inflammatory to help the inflammation in her lower back left ankle and knees. Patient denies taking any anti-inflammatories to help with inflammation. Patient denies myalgias, incontinence, chest pain, shortness of breath, palpitations, fever, chills, nausea, vomiting, tremor, diarrhea, abdominal pain, constipation.      Left leg cellulitis  Patient's activity is diagnosed with cellulitis on 5/18/2017. Ht was hospitalized and placed on IV antibiotics. Patient was seen by me on 6/1/2017 and given Bactrim. Patient is here today for further evaluation of swelling of left medial malleolus region. Patient states he has not been very active due to chronic low back pain. Denies discharge, pruritus, erythema, trauma, fever, chills, vomiting, diarrhea, abdominal pain.        Patient Active Problem List    Diagnosis Date Noted   • Left leg cellulitis 05/18/2017     Priority: High   • Obesity (BMI 30-39.9) 06/01/2017   • Essential hypertension 05/21/2017   • Chronic bilateral low back pain with sciatica 05/21/2017   • Attention deficit  hyperactivity disorder (ADHD), predominantly inattentive type 05/21/2017       Allergies:Review of patient's allergies indicates no known allergies.    Current Outpatient Prescriptions   Medication Sig Dispense Refill   • meloxicam (MOBIC) 15 MG tablet Take 1 Tab by mouth every day. 30 Tab 1   • cyclobenzaprine (FLEXERIL) 10 MG Tab Take 1 Tab by mouth 3 times a day as needed for Moderate Pain or Muscle Spasms. 30 Tab 0   • hydrocodone-acetaminophen (NORCO) 5-325 MG Tab per tablet Take 1-2 Tabs by mouth every four hours as needed.     • sulfamethoxazole-trimethoprim (BACTRIM) 400-80 MG Tab Take 1 Tab by mouth 2 times a day. 20 Tab 0   • metoprolol (LOPRESSOR) 25 MG Tab Take 1 Tab by mouth 2 Times a Day. 60 Tab 1   • losartan (COZAAR) 100 MG Tab Take 1 Tab by mouth every day. 30 Tab 1   • tramadol (ULTRAM) 50 MG Tab Take 50 mg by mouth every 6 hours as needed.     • amphetamine-dextroamphetamine (ADDERALL, 5MG,) 5 MG Tab Take 5 mg by mouth 2 times a day.     • vitamin D, Ergocalciferol, (DRISDOL) 13456 UNITS Cap capsule Take 50,000 Units by mouth every 7 days.     • oxycodone-acetaminophen (PERCOCET) 7.5-325 MG per tablet Take 1 Tab by mouth every 8 hours as needed.     • fluticasone (FLONASE) 50 MCG/ACT nasal spray Spray 1 Spray in nose every day.       No current facility-administered medications for this visit.       Social History   Substance Use Topics   • Smoking status: Never Smoker    • Smokeless tobacco: Never Used   • Alcohol Use: No       No family status information on file.     Family History   Problem Relation Age of Onset   • No Known Problems Mother    • Hypertension Father    • No Known Problems Brother    • Hypertension Maternal Grandmother    • Genetic Maternal Grandfather    • Cancer Paternal Grandmother      melanoma   • No Known Problems Paternal Grandfather        ROS:  Review of Systems   Constitutional: Negative for fever, chills, weight loss and malaise/fatigue.   HENT: Negative for ear pain,  "nosebleeds, congestion, sore throat and neck pain.    Eyes: Negative for blurred vision.   Respiratory: Negative for cough, sputum production, shortness of breath and wheezing.    Cardiovascular: Negative for chest pain, palpitations, orthopnea and leg swelling.   Gastrointestinal: Negative for heartburn, nausea, vomiting and abdominal pain.   Genitourinary: Negative for dysuria, urgency and frequency.   Musculoskeletal: Negative for myalgias, positive back pain with bilateral sciatica and positive bilateral knee joint pain. Positive left lower extremity pain  Skin: Negative for rash and itching.   Neurological: Negative for dizziness, tingling, tremors, sensory change, focal weakness and headaches.   Endo/Heme/Allergies: Does not bruise/bleed easily.   Psychiatric/Behavioral: Negative for depression, suicidal ideas and memory loss.  The patient is not nervous/anxious and does not have insomnia.    All other systems reviewed and are negative except as in HPI.    Exam: Blood pressure 120/68, pulse 120, temperature 37 °C (98.6 °F), resp. rate 16, height 2.007 m (6' 7\"), weight 120.203 kg (265 lb), SpO2 96 %. Body mass index is 29.84 kg/(m^2).  General: Normal appearing. No distress.  HEENT: Normocephalic. Eyes conjunctiva clear lids without ptosis, pupils equal and reactive to light accommodation, ears normal shape and contour, canals are clear bilaterally, tympanic membranes are benign, nasal mucosa benign, oropharynx is without erythema, edema or exudates.   Neck: Supple without JVD or bruit. Thyroid is not enlarged.  Pulmonary: Clear to ausculation.  Normal effort. No rales, ronchi, or wheezing.  Cardiovascular: Regular rate and rhythm without murmur. Carotid and radial pulses are intact and equal bilaterally.  Abdomen: Soft, nontender, nondistended. Normal bowel sounds. Liver and spleen are not palpable  Neurologic: Grossly nonfocal.  Cranial nerves are normal. DTR's normal and symmetric.  Lymph: No cervical, " supraclavicular or axillary lymph nodes are palpable  Skin: Warm and dry.  No rashes or suspicious skin lesions.  Musculoskeletal: Normal gait. No extremity cyanosis, clubbing, or edema. Tenderness with palpation on the left medial malleolus area. Tender to palpation on left outer medial patella.  Psych: Normal mood and affect. Alert and oriented x3. Judgment and insight is normal.    Medical decision-making and discussion: Patient is a 36-year-old male who is here today to follow-up left lower leg cellulitis and chronic low back pain with bilateral sciatica. Patient states left lower medial malleolus region is still swollen. When examining ankle area is swollen but without erythematous and warm. Patient denies taking anti-inflammatories at this time and I have prescribed otic 15 mg tablet once daily to help with inflammation. Advised patient to increase her daily activity. Patient is still walking with a cane and compensating for lower back pain. Patient states he feels that he compensates for right sided back pain by allowing the left leg to have more weight on it. States his left knee is swollen and hurts with movement. Patient has been going to physical therapy and they started her be on  his knees yesterday. Patient has been referred to orthopedics for localized edema and lower left extremity and refer to neurosurgery for chronic low back pain with bilateral sciatica.    Please note that this dictation was created using voice recognition software. I have made every reasonable attempt to correct obvious errors, but I expect that there are errors of grammar and possibly content that I did not discover before finalizing the note.    Assessment/Plan:  1. Chronic bilateral low back pain with bilateral sciatica  REFERRAL TO NEUROSURGERY   2. Localized edema  meloxicam (MOBIC) 15 MG tablet    REFERRAL TO ORTHOPEDICS   3. Left leg cellulitis         No Follow-up on file.

## 2017-06-16 NOTE — ASSESSMENT & PLAN NOTE
Patient's activity is diagnosed with cellulitis on 5/18/2017. Ht was hospitalized and placed on IV antibiotics. Patient was seen by me on 6/1/2017 and given Bactrim. Patient is here today for further evaluation of swelling of left medial malleolus region. Patient states he has not been very active due to chronic low back pain. Denies discharge, pruritus, erythema, trauma, fever, chills, vomiting, diarrhea, abdominal pain.

## 2017-06-20 ENCOUNTER — HOSPITAL ENCOUNTER (OUTPATIENT)
Dept: PHYSICAL THERAPY | Facility: REHABILITATION | Age: 37
End: 2017-06-20
Payer: COMMERCIAL

## 2017-06-20 PROCEDURE — 97012 MECHANICAL TRACTION THERAPY: CPT

## 2017-06-20 PROCEDURE — 97110 THERAPEUTIC EXERCISES: CPT

## 2017-06-22 ENCOUNTER — APPOINTMENT (OUTPATIENT)
Dept: PHYSICAL THERAPY | Facility: REHABILITATION | Age: 37
End: 2017-06-22
Payer: COMMERCIAL

## 2017-06-23 RX ORDER — LOSARTAN POTASSIUM 100 MG/1
100 TABLET ORAL DAILY
Qty: 90 TAB | Refills: 3 | Status: SHIPPED | OUTPATIENT
Start: 2017-06-23

## 2017-06-27 ENCOUNTER — HOSPITAL ENCOUNTER (OUTPATIENT)
Dept: PHYSICAL THERAPY | Facility: REHABILITATION | Age: 37
End: 2017-06-27
Attending: ORTHOPAEDIC SURGERY
Payer: COMMERCIAL

## 2017-06-27 PROCEDURE — 97012 MECHANICAL TRACTION THERAPY: CPT

## 2017-06-27 PROCEDURE — 97110 THERAPEUTIC EXERCISES: CPT

## 2017-06-29 ENCOUNTER — OFFICE VISIT (OUTPATIENT)
Dept: MEDICAL GROUP | Facility: PHYSICIAN GROUP | Age: 37
End: 2017-06-29
Payer: COMMERCIAL

## 2017-06-29 ENCOUNTER — HOSPITAL ENCOUNTER (OUTPATIENT)
Dept: PHYSICAL THERAPY | Facility: REHABILITATION | Age: 37
End: 2017-06-29
Attending: ORTHOPAEDIC SURGERY
Payer: COMMERCIAL

## 2017-06-29 VITALS
OXYGEN SATURATION: 96 % | SYSTOLIC BLOOD PRESSURE: 110 MMHG | WEIGHT: 260.8 LBS | HEIGHT: 78 IN | DIASTOLIC BLOOD PRESSURE: 70 MMHG | BODY MASS INDEX: 30.18 KG/M2 | TEMPERATURE: 98.4 F | HEART RATE: 115 BPM

## 2017-06-29 DIAGNOSIS — M54.42 CHRONIC BILATERAL LOW BACK PAIN WITH BILATERAL SCIATICA: ICD-10-CM

## 2017-06-29 DIAGNOSIS — G89.29 CHRONIC PAIN OF LEFT KNEE: ICD-10-CM

## 2017-06-29 DIAGNOSIS — E66.9 OBESITY (BMI 30-39.9): ICD-10-CM

## 2017-06-29 DIAGNOSIS — M25.562 CHRONIC PAIN OF LEFT KNEE: ICD-10-CM

## 2017-06-29 DIAGNOSIS — M54.41 CHRONIC BILATERAL LOW BACK PAIN WITH BILATERAL SCIATICA: ICD-10-CM

## 2017-06-29 DIAGNOSIS — G89.29 CHRONIC BILATERAL LOW BACK PAIN WITH BILATERAL SCIATICA: ICD-10-CM

## 2017-06-29 DIAGNOSIS — L03.116 LEFT LEG CELLULITIS: ICD-10-CM

## 2017-06-29 PROCEDURE — 99214 OFFICE O/P EST MOD 30 MIN: CPT | Performed by: PHYSICIAN ASSISTANT

## 2017-06-29 PROCEDURE — 97110 THERAPEUTIC EXERCISES: CPT

## 2017-06-29 RX ORDER — CYCLOBENZAPRINE HCL 10 MG
10 TABLET ORAL 2 TIMES DAILY PRN
Qty: 60 TAB | Refills: 0 | Status: SHIPPED | OUTPATIENT
Start: 2017-06-29 | End: 2017-07-30 | Stop reason: SDUPTHER

## 2017-06-29 NOTE — MR AVS SNAPSHOT
"        Anmol Herring   2017 2:40 PM   Office Visit   MRN: [id number]    Department:  [facility]   Dept Phone:  [phone]    Description:  Male : 1980   Provider:  Nathalie Pfeiffer PA-C           Reason for Visit     Other hosptial and labs      Allergies as of 2017     No Known Allergies      You were diagnosed with     Left leg cellulitis   [098062]       Chronic bilateral low back pain with bilateral sciatica   [5460398]       Chronic pain of left knee   [006067]       Obesity (BMI 30-39.9)   [654567]         Vital Signs     Blood Pressure Pulse Temperature Height Weight Body Mass Index    110/70 mmHg 115 36.9 °C (98.4 °F) 2.007 m (6' 7\") 118.3 kg (260 lb 12.9 oz) 29.37 kg/m2    Oxygen Saturation Smoking Status                96% Never Smoker           Basic Information     Date Of Birth Sex Race Ethnicity Preferred Language    1980 Male White Non- English      Your appointments     2017 12:00 PM   PT Follow Up 30 Minutes with [facility] ([address])    [address] [place] [id number]   [phone]            2017 10:30 AM   PT Follow Up 30 Minutes with [facility] ([address])    [address] [place] [id number]   [phone]              Problem List              ICD-10-CM Priority Class Noted - Resolved    Left leg cellulitis L03.116 High  2017 - Present    Essential hypertension I10   2017 - Present    Chronic bilateral low back pain with sciatica M54.40, G89.29   2017 - Present    Attention deficit hyperactivity disorder (ADHD), predominantly inattentive type F90.0   2017 - Present    Obesity (BMI 30-39.9) E66.9   2017 - Present    Left knee pain M25.562   2017 - Present      Health Maintenance        Date Due Completion Dates    IMM DTaP/Tdap/Td Vaccine (2 - Td) 2027            Current " Immunizations     Tdap Vaccine 5/21/2017 12:29 PM      Below and/or attached are the medications your provider expects you to take. Review all of your home medications and newly ordered medications with your provider and/or pharmacist. Follow medication instructions as directed by your provider and/or pharmacist. Please keep your medication list with you and share with your provider. Update the information when medications are discontinued, doses are changed, or new medications (including over-the-counter products) are added; and carry medication information at all times in the event of emergency situations     Allergies:  No Known Allergies          Medications  Valid as of: June 29, 2017 -  3:25 PM    Generic Name Brand Name Tablet Size Instructions for use    Amphetamine-Dextroamphetamine (Tab) ADDERALL 5 MG Take 5 mg by mouth 2 times a day.        Cyclobenzaprine HCl (Tab) FLEXERIL 10 MG Take 1 Tab by mouth 2 times a day as needed for Moderate Pain or Muscle Spasms.        Ergocalciferol (Cap) DRISDOL 31694 UNITS Take 50,000 Units by mouth every 7 days.        Fluticasone Propionate (Suspension) FLONASE 50 MCG/ACT Spray 1 Spray in nose every day.        Hydrocodone-Acetaminophen (Tab) NORCO 5-325 MG Take 1-2 Tabs by mouth every four hours as needed.        Losartan Potassium (Tab) COZAAR 100 MG Take 1 Tab by mouth every day.        Meloxicam (Tab) MOBIC 15 MG Take 1 Tab by mouth every day.        Metoprolol Tartrate (Tab) LOPRESSOR 25 MG Take 1 Tab by mouth 2 Times a Day.        Oxycodone-Acetaminophen (Tab) PERCOCET 7.5-325 MG Take 1 Tab by mouth every 8 hours as needed.        Sulfamethoxazole-Trimethoprim (Tab) BACTRIM 400-80 MG Take 1 Tab by mouth 2 times a day.        TraMADol HCl (Tab) ULTRAM 50 MG Take 50 mg by mouth every 6 hours as needed.        .                 Medicines prescribed today were sent to:     [facility][id number] - [address] [id number]    Phone:  [phone] Fax: [phone]    Open 24 Hours?: No      Medication refill instructions:       If your prescription bottle indicates you have medication refills left, it is not necessary to call your provider’s office. Please contact your pharmacy and they will refill your medication.    If your prescription bottle indicates you do not have any refills left, you may request refills at any time through one of the following ways: The online [employer] system (except Urgent Care), by calling your provider’s office, or by asking your pharmacy to contact your provider’s office with a refill request. Medication refills are processed only during regular business hours and may not be available until the next business day. Your provider may request additional information or to have a follow-up visit with you prior to refilling your medication.   *Please Note: Medication refills are assigned a new Rx number when refilled electronically. Your pharmacy may indicate that no refills were authorized even though a new prescription for the same medication is available at the pharmacy. Please request the medicine by name with the pharmacy before contacting your provider for a refill.           [employer] Access Code: Activation code not generated  Current [employer] Status: Active
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
## 2017-06-29 NOTE — ASSESSMENT & PLAN NOTE
Patient states he had been suffering from lower back pain with bilateral sciatica for 7-8 weeks until recent physical therapy sessions. States lumbar pain is a dull, aching, intermittent pain that is non radiating at this time. States symptoms are aggravated with movement. States he has been taking flexeril 10 mg, OTC Advil, and mobic 15 mg to alleviate pain symptoms.   Patient states since attending physical therapy sessions he is no longer experiencing sciatic but is still experiencing lumbar pain. States an MRI was performed in [place] and revealed an L3-L4 tear. Patient denies myalgias, incontinence, chest pain, shortness of breath, palpitations, fever, chills, nausea, vomiting, tremor, diarrhea, abdominal pain, constipation, sciatica.

## 2017-06-29 NOTE — ASSESSMENT & PLAN NOTE
Pt. Was diagnosed with cellulitis on 5/28/17 and was hospitalized and placed on IV antibiotics. Patient was prescribed Bactrim on 6/1/17 and completed antibiotics. Pt. States he was see by [facility] today and was told that the cellulitis may still be mildly present.  Patient states that he was told that the inflammation and cellulitis should subside with time. Pt. States he started taking mobic 15 mg tablet daily since our last visit 6/15/17 and states inflammation has subsided. States he is now walking with a cane and gait is much better.States he is still experiencing some pain with activity. States pain in a dull, aching pain is a 1 on a scale from 1-10.  Denies trauma, swelling, erythema, fever, chills, diarrhea, abdominal pain, nausea, vomiting.

## 2017-06-29 NOTE — ASSESSMENT & PLAN NOTE
Patient states he has been improving on diet. States he has been attending physical therapy and is feeling much better. States he hopes to start working out in the near future. States he has lost 5 pounds since last office visit.
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
## 2017-07-03 ENCOUNTER — HOSPITAL ENCOUNTER (OUTPATIENT)
Dept: PHYSICAL THERAPY | Facility: REHABILITATION | Age: 37
End: 2017-07-03
Attending: ORTHOPAEDIC SURGERY
Payer: COMMERCIAL

## 2017-07-03 PROCEDURE — 97110 THERAPEUTIC EXERCISES: CPT

## 2017-07-03 PROCEDURE — 97012 MECHANICAL TRACTION THERAPY: CPT

## 2017-07-06 ENCOUNTER — HOSPITAL ENCOUNTER (OUTPATIENT)
Dept: PHYSICAL THERAPY | Facility: REHABILITATION | Age: 37
End: 2017-07-06
Attending: ORTHOPAEDIC SURGERY
Payer: COMMERCIAL

## 2017-07-06 PROCEDURE — 97110 THERAPEUTIC EXERCISES: CPT

## 2017-07-11 ENCOUNTER — HOSPITAL ENCOUNTER (OUTPATIENT)
Dept: LAB | Facility: MEDICAL CENTER | Age: 37
End: 2017-07-11
Attending: FAMILY MEDICINE
Payer: COMMERCIAL

## 2017-07-11 LAB
BASOPHILS # BLD AUTO: 0.6 % (ref 0–1.8)
BASOPHILS # BLD: 0.06 K/UL (ref 0–0.12)
EOSINOPHIL # BLD AUTO: 0.19 K/UL (ref 0–0.51)
EOSINOPHIL NFR BLD: 1.9 % (ref 0–6.9)
ERYTHROCYTE [DISTWIDTH] IN BLOOD BY AUTOMATED COUNT: 41.9 FL (ref 35.9–50)
ERYTHROCYTE [SEDIMENTATION RATE] IN BLOOD BY WESTERGREN METHOD: 79 MM/HOUR (ref 0–15)
HCT VFR BLD AUTO: 37.9 % (ref 42–52)
HGB BLD-MCNC: 12 G/DL (ref 14–18)
IMM GRANULOCYTES # BLD AUTO: 0.04 K/UL (ref 0–0.11)
IMM GRANULOCYTES NFR BLD AUTO: 0.4 % (ref 0–0.9)
LYMPHOCYTES # BLD AUTO: 2.39 K/UL (ref 1–4.8)
LYMPHOCYTES NFR BLD: 24.5 % (ref 22–41)
MCH RBC QN AUTO: 27 PG (ref 27–33)
MCHC RBC AUTO-ENTMCNC: 31.7 G/DL (ref 33.7–35.3)
MCV RBC AUTO: 85.4 FL (ref 81.4–97.8)
MONOCYTES # BLD AUTO: 0.92 K/UL (ref 0–0.85)
MONOCYTES NFR BLD AUTO: 9.4 % (ref 0–13.4)
NEUTROPHILS # BLD AUTO: 6.16 K/UL (ref 1.82–7.42)
NEUTROPHILS NFR BLD: 63.2 % (ref 44–72)
NRBC # BLD AUTO: 0 K/UL
NRBC BLD AUTO-RTO: 0 /100 WBC
PLATELET # BLD AUTO: 374 K/UL (ref 164–446)
PMV BLD AUTO: 10.4 FL (ref 9–12.9)
RBC # BLD AUTO: 4.44 M/UL (ref 4.7–6.1)
WBC # BLD AUTO: 9.8 K/UL (ref 4.8–10.8)

## 2017-07-11 PROCEDURE — 87591 N.GONORRHOEAE DNA AMP PROB: CPT

## 2017-07-11 PROCEDURE — 85652 RBC SED RATE AUTOMATED: CPT

## 2017-07-11 PROCEDURE — 87491 CHLMYD TRACH DNA AMP PROBE: CPT

## 2017-07-11 PROCEDURE — 36415 COLL VENOUS BLD VENIPUNCTURE: CPT

## 2017-07-11 PROCEDURE — 85025 COMPLETE CBC W/AUTO DIFF WBC: CPT

## 2017-07-12 LAB
C TRACH DNA SPEC QL NAA+PROBE: NEGATIVE
N GONORRHOEA DNA SPEC QL NAA+PROBE: NEGATIVE
SPECIMEN SOURCE: NORMAL

## 2017-07-13 ENCOUNTER — TELEPHONE (OUTPATIENT)
Dept: MEDICAL GROUP | Facility: PHYSICIAN GROUP | Age: 37
End: 2017-07-13

## 2017-07-13 NOTE — TELEPHONE ENCOUNTER
----- Message from Your Healthcare Team sent at 7/13/2017 10:24 AM PDT -----  Regarding: Non-Urgent Medical Question  Contact: [phone]  Nathalie,    The orthopedic doctor found out that I have contracted Lyme Disease.  They started me on Doxycycline Hyclate 100MG Tab for the next 6 weeks.

## 2017-07-13 NOTE — TELEPHONE ENCOUNTER
1. Caller Name: Anmol Herring                      Call Back Number: [phone] (home)     2. Message: Pt diagnosed with Lyme disease wanted to notify pcp.     3. Patient approves office to leave a detailed voicemail/MyChart message: N\A

## 2017-07-21 ENCOUNTER — HOSPITAL ENCOUNTER (OUTPATIENT)
Dept: RADIOLOGY | Facility: MEDICAL CENTER | Age: 37
End: 2017-07-21
Attending: NEUROLOGICAL SURGERY
Payer: COMMERCIAL

## 2017-07-21 DIAGNOSIS — M54.50 CHRONIC LOW BACK PAIN WITHOUT SCIATICA, UNSPECIFIED BACK PAIN LATERALITY: ICD-10-CM

## 2017-07-21 DIAGNOSIS — G89.29 CHRONIC LOW BACK PAIN WITHOUT SCIATICA, UNSPECIFIED BACK PAIN LATERALITY: ICD-10-CM

## 2017-07-21 PROCEDURE — 72110 X-RAY EXAM L-2 SPINE 4/>VWS: CPT

## 2017-08-01 RX ORDER — CYCLOBENZAPRINE HCL 10 MG
TABLET ORAL
Qty: 60 TAB | Refills: 0 | Status: SHIPPED | OUTPATIENT
Start: 2017-08-01 | End: 2019-03-31

## 2017-08-28 DIAGNOSIS — R60.0 LOCALIZED EDEMA: ICD-10-CM

## 2017-08-28 NOTE — TELEPHONE ENCOUNTER
Was the patient seen in the last year in this department? Yes   Does patient have an active prescription for medications requested? No   Received Request Via: Pharmacy

## 2017-08-29 RX ORDER — MELOXICAM 15 MG/1
TABLET ORAL
Qty: 30 TAB | Refills: 0 | Status: SHIPPED | OUTPATIENT
Start: 2017-08-29 | End: 2019-09-15

## 2017-09-08 ENCOUNTER — HOSPITAL ENCOUNTER (OUTPATIENT)
Dept: PHYSICAL THERAPY | Facility: REHABILITATION | Age: 37
End: 2017-09-08
Attending: FAMILY MEDICINE
Payer: COMMERCIAL

## 2017-09-08 PROCEDURE — 97110 THERAPEUTIC EXERCISES: CPT

## 2017-09-08 PROCEDURE — 97161 PT EVAL LOW COMPLEX 20 MIN: CPT

## 2017-09-12 ENCOUNTER — HOSPITAL ENCOUNTER (OUTPATIENT)
Dept: PHYSICAL THERAPY | Facility: REHABILITATION | Age: 37
End: 2017-09-12
Attending: FAMILY MEDICINE
Payer: COMMERCIAL

## 2017-09-12 PROCEDURE — 97110 THERAPEUTIC EXERCISES: CPT

## 2017-09-14 ENCOUNTER — HOSPITAL ENCOUNTER (OUTPATIENT)
Dept: PHYSICAL THERAPY | Facility: REHABILITATION | Age: 37
End: 2017-09-14
Attending: FAMILY MEDICINE
Payer: COMMERCIAL

## 2017-09-14 PROCEDURE — 97110 THERAPEUTIC EXERCISES: CPT

## 2017-09-21 ENCOUNTER — HOSPITAL ENCOUNTER (OUTPATIENT)
Dept: PHYSICAL THERAPY | Facility: REHABILITATION | Age: 37
End: 2017-09-21
Attending: FAMILY MEDICINE
Payer: COMMERCIAL

## 2017-09-21 PROCEDURE — 97110 THERAPEUTIC EXERCISES: CPT

## 2017-09-26 ENCOUNTER — HOSPITAL ENCOUNTER (OUTPATIENT)
Dept: PHYSICAL THERAPY | Facility: REHABILITATION | Age: 37
End: 2017-09-26
Attending: FAMILY MEDICINE
Payer: COMMERCIAL

## 2017-09-26 PROCEDURE — 97110 THERAPEUTIC EXERCISES: CPT

## 2017-09-28 ENCOUNTER — HOSPITAL ENCOUNTER (OUTPATIENT)
Dept: PHYSICAL THERAPY | Facility: REHABILITATION | Age: 37
End: 2017-09-28
Attending: FAMILY MEDICINE
Payer: COMMERCIAL

## 2017-09-28 PROCEDURE — 97110 THERAPEUTIC EXERCISES: CPT

## 2018-02-27 ENCOUNTER — HOSPITAL ENCOUNTER (OUTPATIENT)
Dept: RADIOLOGY | Facility: MEDICAL CENTER | Age: 38
End: 2018-02-27
Attending: FAMILY MEDICINE
Payer: COMMERCIAL

## 2018-02-27 DIAGNOSIS — A69.23 LYME ARTHRITIS (HCC): ICD-10-CM

## 2018-02-27 PROCEDURE — 76536 US EXAM OF HEAD AND NECK: CPT

## 2018-03-15 ENCOUNTER — HOSPITAL ENCOUNTER (OUTPATIENT)
Dept: RADIOLOGY | Facility: MEDICAL CENTER | Age: 38
End: 2018-03-15
Attending: FAMILY MEDICINE
Payer: COMMERCIAL

## 2018-03-15 DIAGNOSIS — E04.1 LEFT THYROID NODULE: ICD-10-CM

## 2018-03-15 PROCEDURE — A9516 IODINE I-123 SOD IODIDE MIC: HCPCS

## 2018-03-15 PROCEDURE — 78014 THYROID IMAGING W/BLOOD FLOW: CPT

## 2018-03-29 ENCOUNTER — HOSPITAL ENCOUNTER (OUTPATIENT)
Dept: RADIOLOGY | Facility: MEDICAL CENTER | Age: 38
End: 2018-03-29
Attending: FAMILY MEDICINE
Payer: COMMERCIAL

## 2018-03-29 DIAGNOSIS — E04.1 THYROID NODULE: ICD-10-CM

## 2018-03-29 PROCEDURE — 10022 IR-FINE NEEDLE ASPIR W/GUIDE(FL): CPT

## 2018-03-29 PROCEDURE — 88112 CYTOPATH CELL ENHANCE TECH: CPT

## 2018-03-29 ASSESSMENT — PAIN SCALES - GENERAL: PAINLEVEL_OUTOF10: 0

## 2018-03-29 NOTE — PROGRESS NOTES
"Patient given [facility] \"Preventing the Spread of Infection\" brochure upon arrival.     US guided Left thyroid nodule fine needle aspiration done by Dr. Martinez; left anterior aspect of neck access site; 1 jar of cytolyt obtained and sent to pathology lab; pt tolerated the procedure well; pt hemodynamically stable pre/intra/post procedure; all questions and concerns answered prior to being d/c; patient provided with appropriate education for procedure; pt d/c home.  "

## 2018-07-05 ENCOUNTER — HOSPITAL ENCOUNTER (OUTPATIENT)
Dept: RADIOLOGY | Facility: MEDICAL CENTER | Age: 38
End: 2018-07-05
Attending: INTERNAL MEDICINE
Payer: COMMERCIAL

## 2018-07-05 DIAGNOSIS — R06.83 SNORING: ICD-10-CM

## 2018-07-05 DIAGNOSIS — E29.1 TESTOSTERONE DEFICIENCY IN MALE: ICD-10-CM

## 2018-07-05 DIAGNOSIS — A69.20 LYME DISEASE: ICD-10-CM

## 2018-07-05 DIAGNOSIS — E04.1 THYROID NODULE: ICD-10-CM

## 2018-07-05 PROCEDURE — 88112 CYTOPATH CELL ENHANCE TECH: CPT

## 2018-07-05 PROCEDURE — 10022 IR-NEEDLE BX-THYROID: CPT

## 2018-07-05 NOTE — PROGRESS NOTES
"Patient given [facility] \"Preventing the Spread of Infection\" Brochure upon being checked in.     US guided left thyroid nodule fine needle aspiration done by Dr. Lino; NON-SEDATION  (no H&P required as this is a NON SEDATION procedure); left anterior aspect of neck access site; procedural RN: Watson; 1 jar of cytolyt and 1 vial veracyte (Affirma) obtained and sent to pathology lab; pt tolerated the procedure well; pt remained stable pre/intra/post procedure; all questions and concerns answered prior to being d/c; patient provided with appropriate education for procedure; pt d/c home.     "

## 2019-03-04 ENCOUNTER — HOSPITAL ENCOUNTER (OUTPATIENT)
Dept: RADIOLOGY | Facility: MEDICAL CENTER | Age: 39
End: 2019-03-04
Attending: INTERNAL MEDICINE
Payer: COMMERCIAL

## 2019-03-04 DIAGNOSIS — E04.1 THYROID NODULE: ICD-10-CM

## 2019-03-04 PROCEDURE — 76536 US EXAM OF HEAD AND NECK: CPT

## 2019-03-31 ENCOUNTER — OFFICE VISIT (OUTPATIENT)
Dept: URGENT CARE | Facility: CLINIC | Age: 39
End: 2019-03-31
Payer: COMMERCIAL

## 2019-03-31 VITALS
DIASTOLIC BLOOD PRESSURE: 60 MMHG | TEMPERATURE: 98.1 F | SYSTOLIC BLOOD PRESSURE: 110 MMHG | RESPIRATION RATE: 16 BRPM | OXYGEN SATURATION: 95 % | WEIGHT: 274 LBS | HEART RATE: 129 BPM | BODY MASS INDEX: 31.7 KG/M2 | HEIGHT: 78 IN

## 2019-03-31 DIAGNOSIS — M62.830 BACK MUSCLE SPASM: ICD-10-CM

## 2019-03-31 DIAGNOSIS — M54.50 ACUTE BILATERAL LOW BACK PAIN WITHOUT SCIATICA: ICD-10-CM

## 2019-03-31 PROCEDURE — 99214 OFFICE O/P EST MOD 30 MIN: CPT | Performed by: NURSE PRACTITIONER

## 2019-03-31 RX ORDER — KETOROLAC TROMETHAMINE 30 MG/ML
60 INJECTION, SOLUTION INTRAMUSCULAR; INTRAVENOUS ONCE
Status: COMPLETED | OUTPATIENT
Start: 2019-03-31 | End: 2019-03-31

## 2019-03-31 RX ORDER — ANASTROZOLE 1 MG/1
TABLET ORAL
COMMUNITY
Start: 2019-03-29

## 2019-03-31 RX ORDER — CYCLOBENZAPRINE HCL 5 MG
5-10 TABLET ORAL 3 TIMES DAILY PRN
Qty: 30 TAB | Refills: 0 | Status: SHIPPED | OUTPATIENT
Start: 2019-03-31

## 2019-03-31 RX ADMIN — KETOROLAC TROMETHAMINE 60 MG: 30 INJECTION, SOLUTION INTRAMUSCULAR; INTRAVENOUS at 10:08

## 2019-03-31 ASSESSMENT — ENCOUNTER SYMPTOMS
DIZZINESS: 0
BACK PAIN: 1
EYE PAIN: 0
SHORTNESS OF BREATH: 0
PERIANAL NUMBNESS: 0
CHILLS: 0
BOWEL INCONTINENCE: 0
LEG PAIN: 0
TINGLING: 0
NAUSEA: 0
NUMBNESS: 0
FEVER: 0
MYALGIAS: 0
VOMITING: 0
SORE THROAT: 0

## 2019-03-31 NOTE — PROGRESS NOTES
Subjective:     Anmol Herring is a 38 y.o. male who presents for Back Pain (x 2 wks, back spasms)       Back Pain   This is a new problem. Episode onset: 2 weeks. The problem occurs constantly. The problem is unchanged. The pain is present in the lumbar spine. The quality of the pain is described as shooting. The pain does not radiate. The pain is at a severity of 9/10. The pain is moderate. The pain is the same all the time. The symptoms are aggravated by twisting, standing, sitting and bending. Pertinent negatives include no bladder incontinence, bowel incontinence, chest pain, fever, leg pain, numbness, pelvic pain, perianal numbness or tingling. (Severe spasms  ) He has tried chiropractic manipulation, muscle relaxant and NSAIDs for the symptoms. The treatment provided mild relief.     Past Medical History:   Diagnosis Date   • ADD (attention deficit disorder)    • Hypertension    History reviewed. No pertinent surgical history.  Social History     Social History   • Marital status:      Spouse name: N/A   • Number of children: N/A   • Years of education: N/A     Occupational History   • Not on file.     Social History Main Topics   • Smoking status: Never Smoker   • Smokeless tobacco: Never Used   • Alcohol use No   • Drug use: Yes     Types: Marijuana      Comment: at night for sleep   • Sexual activity: Yes     Partners: Male     Other Topics Concern   • Not on file     Social History Narrative   • No narrative on file      Family History   Problem Relation Age of Onset   • No Known Problems Mother    • Hypertension Father    • No Known Problems Brother    • Hypertension Maternal Grandmother    • Genetic Maternal Grandfather    • Cancer Paternal Grandmother         melanoma   • No Known Problems Paternal Grandfather     Review of Systems   Constitutional: Negative for chills and fever.   HENT: Negative for sore throat.    Eyes: Negative for pain.   Respiratory: Negative for shortness of breath.    "  Cardiovascular: Negative for chest pain.   Gastrointestinal: Negative for bowel incontinence, nausea and vomiting.   Genitourinary: Negative for bladder incontinence, hematuria and pelvic pain.   Musculoskeletal: Positive for back pain. Negative for myalgias.   Skin: Negative for rash.   Neurological: Negative for dizziness, tingling and numbness.   No Known Allergies   Objective:   /60 (BP Location: Left arm, Patient Position: Sitting, BP Cuff Size: Large adult)   Pulse (!) 129   Temp 36.7 °C (98.1 °F) (Temporal)   Resp 16   Ht 2.007 m (6' 7\")   Wt 124.3 kg (274 lb)   SpO2 95%   BMI 30.87 kg/m²   Physical Exam   Constitutional: He is oriented to person, place, and time. He appears well-developed and well-nourished. No distress.   HENT:   Head: Normocephalic and atraumatic.   Eyes: Pupils are equal, round, and reactive to light. Conjunctivae and EOM are normal.   Cardiovascular: Normal rate and regular rhythm.    No murmur heard.  Pulmonary/Chest: Effort normal and breath sounds normal. No respiratory distress.   Abdominal: Soft. He exhibits no distension. There is no tenderness.   Musculoskeletal:        Lumbar back: He exhibits pain and spasm. He exhibits normal range of motion and no tenderness.        Back:    Neurological: He is alert and oriented to person, place, and time. He has normal reflexes. No sensory deficit.   Skin: Skin is warm and dry.   Psychiatric: He has a normal mood and affect.         Assessment/Plan:   Assessment    1. Acute bilateral low back pain without sciatica  ketorolac (TORADOL) injection 60 mg   2. Back muscle spasm  cyclobenzaprine (FLEXERIL) 5 MG tablet     Avoid bending, stooping, heavy or repetitive lifting until symptoms resolve.  Avoid prolonged sitting; frequent changes of position may be helpful.  Begin gentle stretching before activity when tolerated. May continue Nsaid therapy.   Pt instructed to take Flexeril only while not at work or while driving. PT " instructed not to drive or operate heavy machinery while taking this medication as it causes drowsiness.  PT also instructed not to drink alcohol while taking this medication because it contains benzodiazepines.  PT verbalized understanding of these instructions.   Differential diagnosis, natural history, supportive care, and indications for immediate follow-up discussed.

## 2019-04-09 ENCOUNTER — HOSPITAL ENCOUNTER (OUTPATIENT)
Dept: LAB | Facility: MEDICAL CENTER | Age: 39
End: 2019-04-09
Attending: PHYSICIAN ASSISTANT
Payer: COMMERCIAL

## 2019-04-09 ENCOUNTER — HOSPITAL ENCOUNTER (OUTPATIENT)
Dept: LAB | Facility: MEDICAL CENTER | Age: 39
End: 2019-04-09
Attending: UROLOGY
Payer: COMMERCIAL

## 2019-04-09 PROCEDURE — 87086 URINE CULTURE/COLONY COUNT: CPT

## 2019-04-12 LAB
BACTERIA UR CULT: NORMAL
SIGNIFICANT IND 70042: NORMAL
SITE SITE: NORMAL
SOURCE SOURCE: NORMAL

## 2019-06-03 ENCOUNTER — HOSPITAL ENCOUNTER (OUTPATIENT)
Dept: LAB | Facility: MEDICAL CENTER | Age: 39
End: 2019-06-03
Attending: UROLOGY
Payer: COMMERCIAL

## 2019-06-03 LAB — PSA SERPL-MCNC: 3.95 NG/ML (ref 0–4)

## 2019-06-03 PROCEDURE — 84153 ASSAY OF PSA TOTAL: CPT

## 2019-06-03 PROCEDURE — 36415 COLL VENOUS BLD VENIPUNCTURE: CPT

## 2019-09-10 ENCOUNTER — HOSPITAL ENCOUNTER (OUTPATIENT)
Dept: LAB | Facility: MEDICAL CENTER | Age: 39
End: 2019-09-10
Attending: PHYSICIAN ASSISTANT
Payer: COMMERCIAL

## 2019-09-10 LAB
T3FREE SERPL-MCNC: 4.89 PG/ML (ref 2.4–4.2)
T4 FREE SERPL-MCNC: 0.71 NG/DL (ref 0.53–1.43)
TSH SERPL DL<=0.005 MIU/L-ACNC: 1.66 UIU/ML (ref 0.38–5.33)

## 2019-09-10 PROCEDURE — 36415 COLL VENOUS BLD VENIPUNCTURE: CPT

## 2019-09-10 PROCEDURE — 84439 ASSAY OF FREE THYROXINE: CPT

## 2019-09-10 PROCEDURE — 84481 FREE ASSAY (FT-3): CPT

## 2019-09-10 PROCEDURE — 84443 ASSAY THYROID STIM HORMONE: CPT

## 2019-09-10 PROCEDURE — 86800 THYROGLOBULIN ANTIBODY: CPT

## 2019-09-12 LAB
THYROGLOB AB SERPL-ACNC: <0.9 IU/ML (ref 0–4)
THYROGLOB SERPL-MCNC: 55 NG/ML (ref 1.3–31.8)
THYROGLOB SERPL-MCNC: ABNORMAL NG/ML (ref 1.3–31.8)

## 2019-09-15 ENCOUNTER — HOSPITAL ENCOUNTER (EMERGENCY)
Facility: MEDICAL CENTER | Age: 39
End: 2019-09-15
Attending: EMERGENCY MEDICINE
Payer: COMMERCIAL

## 2019-09-15 VITALS
BODY MASS INDEX: 32.6 KG/M2 | RESPIRATION RATE: 16 BRPM | WEIGHT: 281.75 LBS | OXYGEN SATURATION: 96 % | DIASTOLIC BLOOD PRESSURE: 87 MMHG | TEMPERATURE: 99 F | HEIGHT: 78 IN | HEART RATE: 97 BPM | SYSTOLIC BLOOD PRESSURE: 155 MMHG
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
## 2019-09-15 DIAGNOSIS — S40.012A CONTUSION OF LEFT SHOULDER, INITIAL ENCOUNTER: ICD-10-CM

## 2019-09-15 DIAGNOSIS — T07.XXXA ABRASIONS OF MULTIPLE SITES: ICD-10-CM

## 2019-09-15 PROCEDURE — 99284 EMERGENCY DEPT VISIT MOD MDM: CPT
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
## 2019-09-15 NOTE — ED NOTES
Pt provided with discharge instructions, instructions for follow up appointment, s/s of when to seek emergency care.  Pt verbalizes understanding.  Pt discharged in good condition.
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
## 2022-05-11 ENCOUNTER — HOSPITAL ENCOUNTER (OUTPATIENT)
Dept: RADIOLOGY | Facility: MEDICAL CENTER | Age: 42
End: 2022-05-11
Attending: CHIROPRACTOR
Payer: COMMERCIAL

## 2022-05-11 DIAGNOSIS — M54.50 ACUTE MIDLINE LOW BACK PAIN WITHOUT SCIATICA: ICD-10-CM

## 2022-05-11 PROCEDURE — 72148 MRI LUMBAR SPINE W/O DYE: CPT
